# Patient Record
Sex: FEMALE | Race: BLACK OR AFRICAN AMERICAN | Employment: UNEMPLOYED | URBAN - METROPOLITAN AREA
[De-identification: names, ages, dates, MRNs, and addresses within clinical notes are randomized per-mention and may not be internally consistent; named-entity substitution may affect disease eponyms.]

---
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
## 2024-04-29 ENCOUNTER — HOSPITAL ENCOUNTER (EMERGENCY)
Age: 19
Discharge: HOME | End: 2024-04-29

## 2024-04-29 VITALS
HEART RATE: 90 BPM | HEIGHT: 67 IN | TEMPERATURE: 98.7 F | RESPIRATION RATE: 18 BRPM | WEIGHT: 130 LBS | BODY MASS INDEX: 20.4 KG/M2 | SYSTOLIC BLOOD PRESSURE: 111 MMHG | OXYGEN SATURATION: 98 % | DIASTOLIC BLOOD PRESSURE: 54 MMHG

## 2024-04-29 DIAGNOSIS — N89.8 VAGINAL DISCHARGE: Primary | ICD-10-CM

## 2024-04-29 LAB
BILIRUB UR QL: NEGATIVE
GLUCOSE UR QL STRIP.AUTO: NEGATIVE MG/DL
HCG UR QL: NEGATIVE
KETONES UR-MCNC: NEGATIVE MG/DL
LEUKOCYTE ESTERASE UR QL STRIP: NEGATIVE
NITRITE UR QL: NEGATIVE
PH UR: 6 (ref 5–9)
PROT UR QL: ABNORMAL MG/DL
RBC # UR STRIP: NEGATIVE
SERVICE CMNT-IMP: ABNORMAL
SERVICE CMNT-IMP: NORMAL
SP GR UR: 1.02 (ref 1–1.02)
UROBILINOGEN UR QL: 0.2 EU/DL (ref 0.2–1)
WET PREP GENITAL: NORMAL
WET PREP GENITAL: NORMAL

## 2024-04-29 PROCEDURE — 87491 CHLMYD TRACH DNA AMP PROBE: CPT

## 2024-04-29 PROCEDURE — 6360000002 HC RX W HCPCS: Performed by: STUDENT IN AN ORGANIZED HEALTH CARE EDUCATION/TRAINING PROGRAM

## 2024-04-29 PROCEDURE — 99284 EMERGENCY DEPT VISIT MOD MDM: CPT

## 2024-04-29 PROCEDURE — 81003 URINALYSIS AUTO W/O SCOPE: CPT

## 2024-04-29 PROCEDURE — 81025 URINE PREGNANCY TEST: CPT

## 2024-04-29 PROCEDURE — 87591 N.GONORRHOEAE DNA AMP PROB: CPT

## 2024-04-29 PROCEDURE — 87210 SMEAR WET MOUNT SALINE/INK: CPT

## 2024-04-29 PROCEDURE — 96372 THER/PROPH/DIAG INJ SC/IM: CPT

## 2024-04-29 RX ORDER — FLUCONAZOLE 150 MG/1
150 TABLET ORAL ONCE
Qty: 1 TABLET | Refills: 0 | Status: SHIPPED | OUTPATIENT
Start: 2024-04-29 | End: 2024-04-29

## 2024-04-29 RX ORDER — DOXYCYCLINE HYCLATE 100 MG
100 TABLET ORAL 2 TIMES DAILY
Qty: 14 TABLET | Refills: 0 | Status: SHIPPED | OUTPATIENT
Start: 2024-04-29 | End: 2024-05-06

## 2024-04-29 RX ORDER — GENTAMICIN 40 MG/ML
240 INJECTION, SOLUTION INTRAMUSCULAR; INTRAVENOUS ONCE
Status: COMPLETED | OUTPATIENT
Start: 2024-04-29 | End: 2024-04-29

## 2024-04-29 RX ADMIN — GENTAMICIN SULFATE 240 MG: 40 INJECTION, SOLUTION INTRAMUSCULAR; INTRAVENOUS at 22:45

## 2024-04-29 ASSESSMENT — PAIN DESCRIPTION - LOCATION: LOCATION: OTHER (COMMENT)

## 2024-04-29 ASSESSMENT — ENCOUNTER SYMPTOMS
TROUBLE SWALLOWING: 0
COUGH: 0
VOMITING: 0
SHORTNESS OF BREATH: 0
ABDOMINAL PAIN: 1
PHOTOPHOBIA: 0
FACIAL SWELLING: 0

## 2024-04-29 ASSESSMENT — PAIN SCALES - GENERAL: PAINLEVEL_OUTOF10: 6

## 2024-04-29 ASSESSMENT — PAIN DESCRIPTION - ORIENTATION: ORIENTATION: RIGHT

## 2024-04-30 NOTE — ED TRIAGE NOTES
C/o r side pain, describes as cramp, started a week ago ,states not any better since a week, has not taken any meds for it, states intermittent, postion changes and time of day doesn't seem to affect pain, denies injury, nausea, vomiting, or diarrhea. Does report green discharge and odor that started after her last period

## 2024-04-30 NOTE — DISCHARGE INSTRUCTIONS
We have sent gonorrhea and Chlamydia testing to the lab.  You should be able to access your results the next couple days.  Please take the antibiotics for the full course.  Do not engage in any sexual activity till after treatment is complete.  If you are positive, recommend your partner gets tested and treated as well.  Return here for new or worsening symptoms.    As we discussed, I did not find a life threatening cause of your symptoms today. However, THAT DOES NOT MEAN IT COULD NOT DEVELOP. If you develop ANY new or worsening symptoms, it is critical that you return for re-evaluation. This includes any symptoms that are concerning to you, especially symptoms such as constant abdominal pain that does not go away, fevers, vomiting.  If you do not return for re-evaluation, you risk serious complications, including death.

## 2024-04-30 NOTE — ED PROVIDER NOTES
Procedures     Procedures    Orders Placed This Encounter   Procedures    Wet Prep    C.trachomatis N.gonorrhoeae DNA    POCT Urine Dipstick    Misc nursing order (specify)    Misc nursing order (specify)    POC Urine Pregnancy (Select for females age 10-55)    POCT Urinalysis no Micro    POC Pregnancy Urine Qual        Medications given during this emergency department visit:  Medications   gentamicin (GARAMYCIN) injection 240 mg (240 mg IntraMUSCular Given 4/29/24 5747)       Discharge Medication List as of 4/29/2024 11:00 PM        START taking these medications    Details   doxycycline hyclate (VIBRA-TABS) 100 MG tablet Take 1 tablet by mouth 2 times daily for 7 days, Disp-14 tablet, R-0Print      fluconazole (DIFLUCAN) 150 MG tablet Take 1 tablet by mouth once for 1 dose, Disp-1 tablet, R-0Print              Past Medical History:   Diagnosis Date    Asthma     Migraine         History reviewed. No pertinent surgical history.     Social History     Socioeconomic History    Marital status: Single     Spouse name: None    Number of children: None    Years of education: None    Highest education level: None   Tobacco Use    Smoking status: Passive Smoke Exposure - Never Smoker        Discharge Medication List as of 4/29/2024 11:00 PM        CONTINUE these medications which have NOT CHANGED    Details   albuterol sulfate  (90 Base) MCG/ACT inhaler Inhale into the lungsHistorical Med      albuterol (PROVENTIL) (5 MG/ML) 0.5% nebulizer solution Inhale into the lungs onceHistorical Med      ibuprofen (ADVIL;MOTRIN) 600 MG tablet Take 600 mg by mouth every 6 hours as neededHistorical Med      loratadine (CLARITIN) 10 MG tablet Take 10 mg by mouthHistorical Med              Results for orders placed or performed during the hospital encounter of 04/29/24   Wet Prep    Specimen: Vaginal   Result Value Ref Range    Special Requests NO SPECIAL REQUESTS      Wet Prep WBC'S  FEW        Wet Prep NO YEAST,TRICHOMONAS OR

## 2024-05-02 LAB
C TRACH RRNA SPEC QL NAA+PROBE: NEGATIVE
N GONORRHOEA RRNA SPEC QL NAA+PROBE: NEGATIVE
SPECIMEN SOURCE: NORMAL

## 2024-06-05 ENCOUNTER — HOSPITAL ENCOUNTER (EMERGENCY)
Age: 19
Discharge: HOME | End: 2024-06-05
Attending: EMERGENCY MEDICINE

## 2024-06-05 VITALS
TEMPERATURE: 98.4 F | HEART RATE: 68 BPM | WEIGHT: 128 LBS | OXYGEN SATURATION: 98 % | HEIGHT: 67 IN | DIASTOLIC BLOOD PRESSURE: 75 MMHG | BODY MASS INDEX: 20.09 KG/M2 | RESPIRATION RATE: 16 BRPM | SYSTOLIC BLOOD PRESSURE: 122 MMHG

## 2024-06-05 DIAGNOSIS — R10.9 NONSPECIFIC ABDOMINAL PAIN: Primary | ICD-10-CM

## 2024-06-05 DIAGNOSIS — N76.0 VAGINOSIS: ICD-10-CM

## 2024-06-05 LAB
ALBUMIN SERPL-MCNC: 4.1 G/DL (ref 3.5–5)
ALBUMIN/GLOB SERPL: 1.4 (ref 1–1.9)
ALP SERPL-CCNC: 74 U/L (ref 35–104)
ALT SERPL-CCNC: 13 U/L (ref 12–65)
ANION GAP SERPL CALC-SCNC: 10 MMOL/L (ref 9–18)
AST SERPL-CCNC: 19 U/L (ref 15–37)
BACTERIA URNS QL MICRO: ABNORMAL /HPF
BASOPHILS # BLD: 0 K/UL (ref 0–0.2)
BASOPHILS NFR BLD: 0 % (ref 0–2)
BILIRUB SERPL-MCNC: 0.6 MG/DL (ref 0–1.2)
BILIRUB UR QL: NEGATIVE
BUN SERPL-MCNC: 8 MG/DL (ref 6–23)
CALCIUM SERPL-MCNC: 9.1 MG/DL (ref 8.8–10.2)
CASTS URNS QL MICRO: 0 /LPF
CHLORIDE SERPL-SCNC: 106 MMOL/L (ref 98–107)
CO2 SERPL-SCNC: 24 MMOL/L (ref 20–28)
CREAT SERPL-MCNC: 0.97 MG/DL (ref 0.6–1.1)
CRYSTALS URNS QL MICRO: 0 /LPF
DIFFERENTIAL METHOD BLD: NORMAL
EOSINOPHIL # BLD: 0.2 K/UL (ref 0–0.8)
EOSINOPHIL NFR BLD: 2 % (ref 0.5–7.8)
EPI CELLS #/AREA URNS HPF: ABNORMAL /HPF
ERYTHROCYTE [DISTWIDTH] IN BLOOD BY AUTOMATED COUNT: 13.8 % (ref 11.9–14.6)
ERYTHROCYTE [SEDIMENTATION RATE] IN BLOOD: 1 MM/HR (ref 0–20)
GLOBULIN SER CALC-MCNC: 2.9 G/DL (ref 2.3–3.5)
GLUCOSE SERPL-MCNC: 87 MG/DL (ref 70–99)
GLUCOSE UR QL STRIP.AUTO: NEGATIVE MG/DL
HCG UR QL: NEGATIVE
HCT VFR BLD AUTO: 38.8 % (ref 35.8–46.3)
HGB BLD-MCNC: 12.9 G/DL (ref 11.7–15.4)
IMM GRANULOCYTES # BLD AUTO: 0 K/UL (ref 0–0.5)
IMM GRANULOCYTES NFR BLD AUTO: 0 % (ref 0–5)
KETONES UR-MCNC: ABNORMAL MG/DL
LEUKOCYTE ESTERASE UR QL STRIP: ABNORMAL
LIPASE SERPL-CCNC: 25 U/L (ref 13–60)
LYMPHOCYTES # BLD: 2.7 K/UL (ref 0.5–4.6)
LYMPHOCYTES NFR BLD: 28 % (ref 13–44)
MCH RBC QN AUTO: 27.7 PG (ref 26.1–32.9)
MCHC RBC AUTO-ENTMCNC: 33.2 G/DL (ref 31.4–35)
MCV RBC AUTO: 83.4 FL (ref 82–102)
MONOCYTES # BLD: 0.6 K/UL (ref 0.1–1.3)
MONOCYTES NFR BLD: 6 % (ref 4–12)
MUCOUS THREADS URNS QL MICRO: 0 /LPF
NEUTS SEG # BLD: 5.9 K/UL (ref 1.7–8.2)
NEUTS SEG NFR BLD: 63 % (ref 43–78)
NITRITE UR QL: NEGATIVE
NRBC # BLD: 0 K/UL (ref 0–0.2)
OTHER OBSERVATIONS: ABNORMAL
PH UR: 5.5 (ref 5–9)
PLATELET # BLD AUTO: 299 K/UL (ref 150–450)
PMV BLD AUTO: 10.7 FL (ref 9.4–12.3)
POTASSIUM SERPL-SCNC: 4.3 MMOL/L (ref 3.5–5.1)
PROT SERPL-MCNC: 7 G/DL (ref 6.3–8.2)
PROT UR QL: 30 MG/DL
RBC # BLD AUTO: 4.65 M/UL (ref 4.05–5.2)
RBC # UR STRIP: NEGATIVE
RBC #/AREA URNS HPF: ABNORMAL /HPF
SERVICE CMNT-IMP: ABNORMAL
SODIUM SERPL-SCNC: 140 MMOL/L (ref 136–145)
SP GR UR: 1.02 (ref 1–1.02)
UROBILINOGEN UR QL: 0.2 EU/DL (ref 0.2–1)
WBC # BLD AUTO: 9.4 K/UL (ref 4.3–11.1)
WBC URNS QL MICRO: ABNORMAL /HPF

## 2024-06-05 PROCEDURE — 80053 COMPREHEN METABOLIC PANEL: CPT

## 2024-06-05 PROCEDURE — 85652 RBC SED RATE AUTOMATED: CPT

## 2024-06-05 PROCEDURE — 99283 EMERGENCY DEPT VISIT LOW MDM: CPT

## 2024-06-05 PROCEDURE — 81025 URINE PREGNANCY TEST: CPT

## 2024-06-05 PROCEDURE — 83690 ASSAY OF LIPASE: CPT

## 2024-06-05 PROCEDURE — 81001 URINALYSIS AUTO W/SCOPE: CPT

## 2024-06-05 PROCEDURE — 85025 COMPLETE CBC W/AUTO DIFF WBC: CPT

## 2024-06-05 RX ORDER — METRONIDAZOLE 500 MG/1
500 TABLET ORAL 2 TIMES DAILY
Qty: 10 TABLET | Refills: 0 | Status: SHIPPED | OUTPATIENT
Start: 2024-06-05 | End: 2024-06-10

## 2024-06-05 ASSESSMENT — PAIN SCALES - GENERAL: PAINLEVEL_OUTOF10: 7

## 2024-06-05 ASSESSMENT — LIFESTYLE VARIABLES
HOW OFTEN DO YOU HAVE A DRINK CONTAINING ALCOHOL: NEVER
HOW MANY STANDARD DRINKS CONTAINING ALCOHOL DO YOU HAVE ON A TYPICAL DAY: PATIENT DOES NOT DRINK

## 2024-06-05 ASSESSMENT — PAIN - FUNCTIONAL ASSESSMENT: PAIN_FUNCTIONAL_ASSESSMENT: 0-10

## 2024-06-05 ASSESSMENT — PAIN DESCRIPTION - LOCATION: LOCATION: ABDOMEN

## 2024-06-05 ASSESSMENT — ENCOUNTER SYMPTOMS: ABDOMINAL PAIN: 1

## 2024-06-05 ASSESSMENT — PAIN DESCRIPTION - ORIENTATION: ORIENTATION: LEFT

## 2024-06-05 NOTE — ED TRIAGE NOTES
Pt present with complaint of pain to her left sided upper quad abdominal pain.  Pt states she has been seen previously for the same complaint, but that pain remains unchanged. Denies n/v/d

## 2024-06-05 NOTE — ED PROVIDER NOTES
status: Single   Tobacco Use    Smoking status: Passive Smoke Exposure - Never Smoker   Substance and Sexual Activity    Alcohol use: Not Currently        Previous Medications    ALBUTEROL (PROVENTIL) (5 MG/ML) 0.5% NEBULIZER SOLUTION    Inhale into the lungs once    ALBUTEROL SULFATE  (90 BASE) MCG/ACT INHALER    Inhale into the lungs    IBUPROFEN (ADVIL;MOTRIN) 600 MG TABLET    Take 600 mg by mouth every 6 hours as needed    LORATADINE (CLARITIN) 10 MG TABLET    Take 10 mg by mouth        Results for orders placed or performed during the hospital encounter of 06/05/24   CBC with Auto Differential   Result Value Ref Range    WBC 9.4 4.3 - 11.1 K/uL    RBC 4.65 4.05 - 5.2 M/uL    Hemoglobin 12.9 11.7 - 15.4 g/dL    Hematocrit 38.8 35.8 - 46.3 %    MCV 83.4 82.0 - 102.0 FL    MCH 27.7 26.1 - 32.9 PG    MCHC 33.2 31.4 - 35.0 g/dL    RDW 13.8 11.9 - 14.6 %    Platelets 299 150 - 450 K/uL    MPV 10.7 9.4 - 12.3 FL    nRBC 0.00 0.0 - 0.2 K/uL    Differential Type AUTOMATED      Neutrophils % 63 43 - 78 %    Lymphocytes % 28 13 - 44 %    Monocytes % 6 4.0 - 12.0 %    Eosinophils % 2 0.5 - 7.8 %    Basophils % 0 0.0 - 2.0 %    Immature Granulocytes % 0 0.0 - 5.0 %    Neutrophils Absolute 5.9 1.7 - 8.2 K/UL    Lymphocytes Absolute 2.7 0.5 - 4.6 K/UL    Monocytes Absolute 0.6 0.1 - 1.3 K/UL    Eosinophils Absolute 0.2 0.0 - 0.8 K/UL    Basophils Absolute 0.0 0.0 - 0.2 K/UL    Immature Granulocytes Absolute 0.0 0.0 - 0.5 K/UL   Comprehensive Metabolic Panel   Result Value Ref Range    Sodium 140 136 - 145 mmol/L    Potassium 4.3 3.5 - 5.1 mmol/L    Chloride 106 98 - 107 mmol/L    CO2 24 20 - 28 mmol/L    Anion Gap 10 9 - 18 mmol/L    Glucose 87 70 - 99 mg/dL    BUN 8 6 - 23 MG/DL    Creatinine 0.97 0.60 - 1.10 MG/DL    Est, Glom Filt Rate 86 >60 ml/min/1.73m2    Calcium 9.1 8.8 - 10.2 MG/DL    Total Bilirubin 0.6 0.0 - 1.2 MG/DL    ALT 13 12 - 65 U/L    AST 19 15 - 37 U/L    Alk Phosphatase 74 35 - 104 U/L    Total

## 2024-09-05 ENCOUNTER — HOSPITAL ENCOUNTER (EMERGENCY)
Age: 19
Discharge: HOME | End: 2024-09-05
Payer: COMMERCIAL

## 2024-09-05 VITALS
RESPIRATION RATE: 16 BRPM | OXYGEN SATURATION: 97 % | HEIGHT: 67 IN | TEMPERATURE: 98.7 F | WEIGHT: 128 LBS | SYSTOLIC BLOOD PRESSURE: 109 MMHG | HEART RATE: 71 BPM | BODY MASS INDEX: 20.09 KG/M2 | DIASTOLIC BLOOD PRESSURE: 72 MMHG

## 2024-09-05 DIAGNOSIS — B34.9 VIRAL SYNDROME: Primary | ICD-10-CM

## 2024-09-05 DIAGNOSIS — N89.8 VAGINAL LESION: ICD-10-CM

## 2024-09-05 DIAGNOSIS — N89.8 VAGINAL DISCHARGE: ICD-10-CM

## 2024-09-05 LAB
BACTERIA URNS QL MICRO: 0 /HPF
BILIRUB UR QL: NEGATIVE
EPI CELLS #/AREA URNS HPF: NORMAL /HPF
FLUAV RNA SPEC QL NAA+PROBE: NOT DETECTED
FLUBV RNA SPEC QL NAA+PROBE: NOT DETECTED
GLUCOSE UR QL STRIP.AUTO: NEGATIVE MG/DL
HCG UR QL: NEGATIVE
HIV 1+2 AB+HIV1 P24 AG SERPL QL IA: NONREACTIVE
HIV 1/2 RESULT COMMENT: NORMAL
KETONES UR-MCNC: NEGATIVE MG/DL
LEUKOCYTE ESTERASE UR QL STRIP: ABNORMAL
MUCOUS THREADS URNS QL MICRO: 0 /LPF
NITRITE UR QL: NEGATIVE
PH UR: 6 (ref 5–9)
PROT UR QL: NEGATIVE MG/DL
RBC # UR STRIP: NEGATIVE
RBC #/AREA URNS HPF: NORMAL /HPF
SARS-COV-2 RDRP RESP QL NAA+PROBE: NOT DETECTED
SERVICE CMNT-IMP: ABNORMAL
SERVICE CMNT-IMP: NORMAL
SOURCE: NORMAL
SP GR UR: 1.02 (ref 1–1.02)
STREP, MOLECULAR: NOT DETECTED
T PALLIDUM AB SER QL IA: NONREACTIVE
UROBILINOGEN UR QL: 0.2 EU/DL (ref 0.2–1)
WBC URNS QL MICRO: NORMAL /HPF
WET PREP GENITAL: NORMAL
WET PREP GENITAL: NORMAL

## 2024-09-05 PROCEDURE — 87635 SARS-COV-2 COVID-19 AMP PRB: CPT

## 2024-09-05 PROCEDURE — 87491 CHLMYD TRACH DNA AMP PROBE: CPT

## 2024-09-05 PROCEDURE — 86696 HERPES SIMPLEX TYPE 2 TEST: CPT

## 2024-09-05 PROCEDURE — 81025 URINE PREGNANCY TEST: CPT

## 2024-09-05 PROCEDURE — 6360000002 HC RX W HCPCS

## 2024-09-05 PROCEDURE — 87389 HIV-1 AG W/HIV-1&-2 AB AG IA: CPT

## 2024-09-05 PROCEDURE — 2580000003 HC RX 258

## 2024-09-05 PROCEDURE — 6370000000 HC RX 637 (ALT 250 FOR IP)

## 2024-09-05 PROCEDURE — 87651 STREP A DNA AMP PROBE: CPT

## 2024-09-05 PROCEDURE — 96372 THER/PROPH/DIAG INJ SC/IM: CPT

## 2024-09-05 PROCEDURE — 86695 HERPES SIMPLEX TYPE 1 TEST: CPT

## 2024-09-05 PROCEDURE — 87591 N.GONORRHOEAE DNA AMP PROB: CPT

## 2024-09-05 PROCEDURE — 99284 EMERGENCY DEPT VISIT MOD MDM: CPT

## 2024-09-05 PROCEDURE — 81001 URINALYSIS AUTO W/SCOPE: CPT

## 2024-09-05 PROCEDURE — 86780 TREPONEMA PALLIDUM: CPT

## 2024-09-05 PROCEDURE — 87210 SMEAR WET MOUNT SALINE/INK: CPT

## 2024-09-05 PROCEDURE — 87502 INFLUENZA DNA AMP PROBE: CPT

## 2024-09-05 RX ORDER — DOXYCYCLINE HYCLATE 100 MG
100 TABLET ORAL 2 TIMES DAILY
Qty: 14 TABLET | Refills: 0 | Status: SHIPPED | OUTPATIENT
Start: 2024-09-05 | End: 2024-09-12

## 2024-09-05 RX ORDER — DIPHENHYDRAMINE HCL 25 MG
50 CAPSULE ORAL
Status: COMPLETED | OUTPATIENT
Start: 2024-09-05 | End: 2024-09-05

## 2024-09-05 RX ORDER — VALACYCLOVIR HYDROCHLORIDE 1 G/1
1000 TABLET, FILM COATED ORAL 3 TIMES DAILY
Qty: 21 TABLET | Refills: 0 | Status: SHIPPED | OUTPATIENT
Start: 2024-09-05 | End: 2024-09-12

## 2024-09-05 RX ADMIN — DIPHENHYDRAMINE HYDROCHLORIDE 50 MG: 25 CAPSULE ORAL at 19:19

## 2024-09-05 RX ADMIN — WATER 500 MG: 1 INJECTION INTRAMUSCULAR; INTRAVENOUS; SUBCUTANEOUS at 19:19

## 2024-09-05 ASSESSMENT — PAIN - FUNCTIONAL ASSESSMENT: PAIN_FUNCTIONAL_ASSESSMENT: 0-10

## 2024-09-05 ASSESSMENT — LIFESTYLE VARIABLES
HOW MANY STANDARD DRINKS CONTAINING ALCOHOL DO YOU HAVE ON A TYPICAL DAY: PATIENT DOES NOT DRINK
HOW OFTEN DO YOU HAVE A DRINK CONTAINING ALCOHOL: NEVER

## 2024-09-05 ASSESSMENT — PAIN DESCRIPTION - LOCATION: LOCATION: HEAD

## 2024-09-05 ASSESSMENT — PAIN SCALES - GENERAL: PAINLEVEL_OUTOF10: 5

## 2024-09-05 NOTE — ED TRIAGE NOTES
Pt presents ambulatory to triage with co generalized body aches and fever at home yesterday.  Pt states she took antibiotics at home and now has vaginal itching.    Requesting testing for flu and covid.

## 2024-09-05 NOTE — ED PROVIDER NOTES
Emergency Department Provider Note       PCP: Jackie Pool MD   Age: 19 y.o.   Sex: female     DISPOSITION Decision To Discharge 09/05/2024 08:07:32 PM  Condition at Disposition: Good       ICD-10-CM    1. Viral syndrome  B34.9       2. Vaginal discharge  N89.8 [facility]      3. Vaginal lesion  N89.8 [facility]          Medical Decision Making     Patient is a 19-year-old female presenting with fatigue, body aches, sore throats, congestion, and sneezing with concern for COVID after positive exposure last week.  She is also complaining of some vaginal lesions and discomfort over the past 3 days after having unprotected intercourse and is concerned for STDs.    On presentation, patient is afebrile, vital signs are stable, and she is well-appearing in no acute distress.  HEENT exam is benign and reassuring.  She has some mild posterior pharyngeal erythema with some postnasal drip present.  No nuchal rigidity or meningeal signs.  Lungs clear to auscultation in all fields without crackles, wheezes, or other adventitious sounds.  Will obtain flu, strep, and COVID swabs.    Vaginal exam revealed a thick milky greenish discharge in the vaginal canal.  There were some sores/ulcerations on an erythematous base present to the labia majora region concerning for HSV 2.  No cervical motion tenderness or any other abnormalities.    Obtained GC and wet prep swabs.  Patient would like to be prophylactically treated for both gonorrhea and chlamydia.  She would also like to be tested for HSV, HIV, and syphilis.    Patient does have a reported allergy to cefdinir with hives and shortness of breath.  Patient states previously she did have a rash when she took the cefdinir.  I spoke with the hospital pharmacist about alternative regimens for gonorrhea.  We can give patient 500 of ciprofloxacin, however gonorrhea has a large amount of resistance to this antibiotic and  detected NOTD     Group A Strep Screen By PCR    Specimen: Throat   Result Value Ref Range    Strep, Molecular Not detected     Wet prep, genital    Specimen: Vaginal   Result Value Ref Range    Special Requests NO SPECIAL REQUESTS      Wet Prep WBC'S  3 TO 5 PER HPF        Wet Prep NO YEAST,TRICHOMONAS OR CLUE CELLS NOTED     HIV 1/2 Ag/Ab, 4TH Generation,W Rflx Confirm   Result Value Ref Range    HIV 1/2 Interp NONREACTIVE NR      HIV 1/2 Result Comment        While this assay is highly sensitive, a non-reactive result for HIV antibodies HIV-1 and HIV-2 and p24 antigen does not preclude the possiblity of exposure to or infection with HIV.   Treponema Pallidum (Syphilis) Antibody   Result Value Ref Range    Syphillis T. Palladium Ab w/Reflex to RPR NONREACTIVE NR     Urinalysis, Micro   Result Value Ref Range    WBC, UA 3-5 0 /hpf    RBC, UA 3-5 0 /hpf    Epithelial Cells, UA 5-10 0 /hpf    BACTERIA, URINE 0 0 /hpf    Mucus, UA 0 0 /lpf   POCT Urinalysis no Micro   Result Value Ref Range    Specific Gravity, Urine, POC 1.020 1.001 - 1.023      pH, Urine, POC 6.0 5.0 - 9.0      Protein, Urine, POC Negative NEG mg/dL    Glucose, UA POC Negative NEG mg/dL    Ketones, Urine, POC Negative NEG mg/dL    Bilirubin, Urine, POC Negative NEG      Blood, UA POC Negative NEG      URINE UROBILINOGEN POC 0.2 0.2 - 1.0 EU/dL    Nitrite, Urine, POC Negative NEG      Leukocyte Est, UA POC SMALL (A) NEG      Performed by: Rona Pena    POC Pregnancy Urine Qual   Result Value Ref Range    Preg Test, Ur Negative NEG           No orders to display                Recent Labs     09/05/24  1815   COVID19 Not detected       Voice dictation software was used during the making of this note.  This software is not perfect and grammatical and other typographical errors may be present.  This note has not been completely proofread for errors.     Maureen Poe PA-C  09/05/24 9215

## 2024-09-06 NOTE — DISCHARGE INSTRUCTIONS
Please begin taking the doxycycline as prescribed as well as the Valtrex, you can take these medications at the same time.  The doxycycline will cover for chlamydia and the Valtrex will cover for possible herpes.  Continue to closely monitor your results on MyChart.  Please notify all sexual partners of positive results.  From from any sexual activity for the next week and finish full course of antibiotics.  I have also placed a referral into the gynecologist, their numbers listed above.  Please return to the ED immediately with any new or worsening symptoms.

## 2024-09-06 NOTE — ED NOTES
Patient mobility status  with no difficulty. Provider aware     I have reviewed discharge instructions with the patient.  The patient verbalized understanding.    Patient left ED via Discharge Method: ambulatory to Home with parent.    Opportunity for questions and clarification provided.     Patient given 2 scripts.

## 2024-09-08 LAB
HSV1 IGG SER IA-ACNC: 12 INDEX (ref 0–0.9)
HSV2 IGG SER IA-ACNC: <0.91 INDEX (ref 0–0.9)

## 2024-10-02 ENCOUNTER — OFFICE VISIT (OUTPATIENT)
Dept: OBGYN CLINIC | Age: 19
End: 2024-10-02
Payer: COMMERCIAL

## 2024-10-02 VITALS
DIASTOLIC BLOOD PRESSURE: 58 MMHG | HEIGHT: 67 IN | SYSTOLIC BLOOD PRESSURE: 102 MMHG | WEIGHT: 128 LBS | BODY MASS INDEX: 20.09 KG/M2

## 2024-10-02 DIAGNOSIS — N89.8 VAGINAL DISCHARGE: Primary | ICD-10-CM

## 2024-10-02 DIAGNOSIS — N89.8 VAGINAL DISCHARGE: ICD-10-CM

## 2024-10-02 DIAGNOSIS — N89.8 VAGINAL ODOR: ICD-10-CM

## 2024-10-02 DIAGNOSIS — B00.9 HSV (HERPES SIMPLEX VIRUS) INFECTION: ICD-10-CM

## 2024-10-02 DIAGNOSIS — Z72.51 UNPROTECTED SEX: ICD-10-CM

## 2024-10-02 PROCEDURE — 99203 OFFICE O/P NEW LOW 30 MIN: CPT | Performed by: NURSE PRACTITIONER

## 2024-10-02 NOTE — PROGRESS NOTES
Mal Rowan is a 19 y.o. No obstetric history on file. who is here today to discuss vaginal irritation/odor she noticed after using vaginal soap    No symptoms today  Menses Q month - no concerns  Denies hx stds  Sexually active. Not using condoms. Declines birth control. Not actively TTC but ok if it occurs    Due for pap smear in January. Will schedule appt  Patient's last menstrual period was 09/23/2024 (exact date).        Past Medical History:   Diagnosis Date    Asthma     Eczema     Migraine        No past surgical history on file.    Family History   Problem Relation Age of Onset    Breast Cancer Maternal Grandmother     Breast Cancer Paternal Great Grandmother     Uterine Cancer Maternal Great Grandmother     Colon Cancer Neg Hx     Ovarian Cancer Neg Hx        Social History     Socioeconomic History    Marital status: Single     Spouse name: Not on file    Number of children: Not on file    Years of education: Not on file    Highest education level: Not on file   Occupational History    Not on file   Tobacco Use    Smoking status: Never     Passive exposure: Yes    Smokeless tobacco: Not on file   Substance and Sexual Activity    Alcohol use: Not Currently    Drug use: Not on file    Sexual activity: Yes     Partners: Male     Birth control/protection: Condom   Other Topics Concern    Not on file   Social History Narrative    Not on file     Social Determinants of Health     Financial Resource Strain: Not on file   Food Insecurity: Not on file   Transportation Needs: Not on file   Physical Activity: Not on file   Stress: Not on file   Social Connections: Not on file   Intimate Partner Violence: Not on file   Housing Stability: Not on file           Objective:    Vitals:    10/02/24 1439   BP: (!) 102/58   Site: Right Upper Arm   Position: Sitting   Weight: 58.1 kg (128 lb)   Height: 1.702 m (5' 7\")         Constitutional:  well-developed, well-nourished, and in no distress.     Mental: Alert

## 2024-10-02 NOTE — PROGRESS NOTES
New patient here for concern of vaginal odor that is more noticeable after menses end.   Denies vaginal s/s today. Would still live pelvic exam today just to make sure everything is okay and discuss recommendations for her odor.     LAST PAP:  never     LAST MAMMO:  never    LMP:  Patient's last menstrual period was 09/23/2024 (exact date).    BIRTH CONTROL: condoms     TOBACCO USE:  No    FAMILY HISTORY OF:   Breast Cancer:  Yes-maternal grandmother, paternal great grandmother   Ovarian Cancer:  Yes-maternal great grandmother    Uterine Cancer:  No   Colon Cancer:  No    Vitals:    10/02/24 1439   BP: (!) 102/58   Site: Right Upper Arm   Position: Sitting   Weight: 58.1 kg (128 lb)   Height: 1.702 m (5' 7\")        JANEEN LAROSE RN  10/02/24  2:50 PM

## 2024-10-02 NOTE — ASSESSMENT & PLAN NOTE
seroPOS for HSV -1  Has never had an outbreak  [facility] HSV info reviewed at length with pt including etiology, prevention, symptoms, treatment prn    https://www.cdc.gov/herpes/about/index.html

## 2024-10-02 NOTE — ASSESSMENT & PLAN NOTE
Discharge noted today on exma, no symptoms today  Nuswab collected  Below reviewed    You can take the following steps to help prevent future  vaginal infections: Wipe from front to back.  Wear cotton underwear.  No tight clothing.  Avoid bubble baths.  No scented soaps, detergents or other hygiene products.  No douching. Use condoms during intercourse. After intercourse, go to the bathroom to urinate and clean the vaginal area.      Do not use alcohol while taking medication and for 3 days after completing medication.

## 2024-10-03 NOTE — PROGRESS NOTES
Chaperone for Intimate Exam     Chaperone was offer accepted as part of the rooming process    Chaperone: Carolina Barrera

## 2024-10-03 NOTE — PROGRESS NOTES
I have reviewed the patient's visit today including history, exam and assessment by AVELINO Aranda.  I agree with treatment/plan as above.    Karthik Brand MD  8:10 AM  10/03/24

## 2024-10-06 ENCOUNTER — TELEPHONE (OUTPATIENT)
Dept: OBGYN CLINIC | Age: 19
End: 2024-10-06

## 2024-10-06 LAB
A VAGINAE DNA VAG QL NAA+PROBE: NORMAL SCORE
BVAB2 DNA VAG QL NAA+PROBE: NORMAL SCORE
C ALBICANS DNA VAG QL NAA+PROBE: NEGATIVE
C GLABRATA DNA VAG QL NAA+PROBE: NEGATIVE
C TRACH RRNA SPEC QL NAA+PROBE: NEGATIVE
MEGA1 DNA VAG QL NAA+PROBE: NORMAL SCORE
N GONORRHOEA RRNA SPEC QL NAA+PROBE: NEGATIVE
SPECIMEN SOURCE: NORMAL
T VAGINALIS RRNA SPEC QL NAA+PROBE: NEGATIVE

## 2024-10-07 NOTE — TELEPHONE ENCOUNTER
Pt lvm returning my call. Called pt back, informed pt of add on testing. Pt was confused so explained the bacteria and her results. Pt stated she is not having symptoms of anything. Informed pt then we will not do the add on testing since she does not have vaginal symtpoms. Pt voiced understanding.

## 2024-12-02 ENCOUNTER — TELEPHONE (OUTPATIENT)
Dept: OBGYN CLINIC | Age: 19
End: 2024-12-02

## 2024-12-02 NOTE — TELEPHONE ENCOUNTER
Patient called and asked what she can take for n/c. Reviewed pregnancy safe medications for n/v, unisom, B6, and meenakshi. Patient states she has tried B6 and it has not be helpful. I recommended she try the other two and if those are not helpful then she needs to reach back out.    Also reviewed with patient that if she is unable to keep fluids down for more than 6-8 hours then she needs to head to an ER or urgent care for fluids and let us know if this occurs.     Patient verbalized understanding. Also stated that I will send the recommendations and virtual prenatal booklet to patient via [employer]. Patient verbalized understanding.     ii4b message sent.

## 2024-12-04 ENCOUNTER — HOSPITAL ENCOUNTER (EMERGENCY)
Age: 19
Discharge: HOME | End: 2024-12-04
Attending: EMERGENCY MEDICINE
Payer: COMMERCIAL

## 2024-12-04 VITALS
DIASTOLIC BLOOD PRESSURE: 67 MMHG | HEIGHT: 66 IN | TEMPERATURE: 98 F | OXYGEN SATURATION: 100 % | WEIGHT: 128 LBS | SYSTOLIC BLOOD PRESSURE: 102 MMHG | HEART RATE: 69 BPM | RESPIRATION RATE: 20 BRPM | BODY MASS INDEX: 20.57 KG/M2

## 2024-12-04 DIAGNOSIS — O21.0 MILD HYPEREMESIS GRAVIDARUM, ANTEPARTUM: Primary | ICD-10-CM

## 2024-12-04 LAB
ALBUMIN SERPL-MCNC: 4.6 G/DL (ref 3.5–5)
ALBUMIN/GLOB SERPL: 1.5 (ref 1–1.9)
ALP SERPL-CCNC: 58 U/L (ref 35–104)
ALT SERPL-CCNC: 5 U/L (ref 12–65)
ANION GAP SERPL CALC-SCNC: 12 MMOL/L (ref 7–16)
APPEARANCE UR: ABNORMAL
AST SERPL-CCNC: 17 U/L (ref 15–37)
BACTERIA URNS QL MICRO: ABNORMAL /HPF
BASOPHILS # BLD: 0 K/UL (ref 0–0.2)
BASOPHILS NFR BLD: 0 % (ref 0–2)
BILIRUB SERPL-MCNC: 1.3 MG/DL (ref 0–1.2)
BILIRUB UR QL: NEGATIVE
BUN SERPL-MCNC: 9 MG/DL (ref 6–23)
CALCIUM SERPL-MCNC: 9.4 MG/DL (ref 8.8–10.2)
CHLORIDE SERPL-SCNC: 98 MMOL/L (ref 98–107)
CO2 SERPL-SCNC: 23 MMOL/L (ref 20–29)
COLOR UR: YELLOW
CREAT SERPL-MCNC: 0.71 MG/DL (ref 0.8–1.3)
DIFFERENTIAL METHOD BLD: ABNORMAL
EOSINOPHIL # BLD: 0 K/UL (ref 0–0.8)
EOSINOPHIL NFR BLD: 0 % (ref 0.5–7.8)
EPI CELLS #/AREA URNS HPF: ABNORMAL /HPF
ERYTHROCYTE [DISTWIDTH] IN BLOOD BY AUTOMATED COUNT: 13.7 % (ref 11.9–14.6)
GLOBULIN SER CALC-MCNC: 3 G/DL (ref 2.3–3.5)
GLUCOSE SERPL-MCNC: 93 MG/DL (ref 65–100)
GLUCOSE UR STRIP.AUTO-MCNC: NEGATIVE MG/DL
HCG UR QL: POSITIVE
HCT VFR BLD AUTO: 36.8 % (ref 35.8–46.3)
HGB BLD-MCNC: 12.9 G/DL (ref 11.7–15.4)
HGB UR QL STRIP: ABNORMAL
IMM GRANULOCYTES # BLD AUTO: 0 K/UL (ref 0–0.5)
IMM GRANULOCYTES NFR BLD AUTO: 0 % (ref 0–5)
KETONES UR QL STRIP.AUTO: 80 MG/DL
LEUKOCYTE ESTERASE UR QL STRIP.AUTO: NEGATIVE
LIPASE SERPL-CCNC: 19 U/L (ref 13–60)
LYMPHOCYTES # BLD: 1.3 K/UL (ref 0.5–4.6)
LYMPHOCYTES NFR BLD: 12 % (ref 13–44)
MCH RBC QN AUTO: 28.2 PG (ref 26.1–32.9)
MCHC RBC AUTO-ENTMCNC: 35.1 G/DL (ref 31.4–35)
MCV RBC AUTO: 80.5 FL (ref 82–102)
MONOCYTES # BLD: 0.7 K/UL (ref 0.1–1.3)
MONOCYTES NFR BLD: 6 % (ref 4–12)
MUCOUS THREADS URNS QL MICRO: ABNORMAL /LPF
NEUTS SEG # BLD: 9 K/UL (ref 1.7–8.2)
NEUTS SEG NFR BLD: 81 % (ref 43–78)
NITRITE UR QL STRIP.AUTO: NEGATIVE
NRBC # BLD: 0 K/UL (ref 0–0.2)
OTHER OBSERVATIONS: ABNORMAL
PH UR STRIP: 6 (ref 5–9)
PLATELET # BLD AUTO: 300 K/UL (ref 150–450)
PMV BLD AUTO: 10.6 FL (ref 9.4–12.3)
POTASSIUM SERPL-SCNC: 3.8 MMOL/L (ref 3.5–5.1)
PROT SERPL-MCNC: 7.6 G/DL (ref 6.3–8.2)
PROT UR STRIP-MCNC: NEGATIVE MG/DL
RBC # BLD AUTO: 4.57 M/UL (ref 4.05–5.2)
RBC #/AREA URNS HPF: ABNORMAL /HPF
SODIUM SERPL-SCNC: 133 MMOL/L (ref 133–143)
SP GR UR REFRACTOMETRY: 1.02 (ref 1–1.02)
UROBILINOGEN UR QL STRIP.AUTO: 0.2 EU/DL (ref 0.2–1)
WBC # BLD AUTO: 11.1 K/UL (ref 4.3–11.1)
WBC URNS QL MICRO: ABNORMAL /HPF

## 2024-12-04 PROCEDURE — 87086 URINE CULTURE/COLONY COUNT: CPT

## 2024-12-04 PROCEDURE — 6360000002 HC RX W HCPCS: Performed by: EMERGENCY MEDICINE

## 2024-12-04 PROCEDURE — 81001 URINALYSIS AUTO W/SCOPE: CPT

## 2024-12-04 PROCEDURE — 85025 COMPLETE CBC W/AUTO DIFF WBC: CPT

## 2024-12-04 PROCEDURE — 81025 URINE PREGNANCY TEST: CPT

## 2024-12-04 PROCEDURE — 99284 EMERGENCY DEPT VISIT MOD MDM: CPT

## 2024-12-04 PROCEDURE — 2580000003 HC RX 258: Performed by: EMERGENCY MEDICINE

## 2024-12-04 PROCEDURE — 80053 COMPREHEN METABOLIC PANEL: CPT

## 2024-12-04 PROCEDURE — 83690 ASSAY OF LIPASE: CPT

## 2024-12-04 PROCEDURE — 96374 THER/PROPH/DIAG INJ IV PUSH: CPT

## 2024-12-04 RX ORDER — ONDANSETRON 4 MG/1
4 TABLET, ORALLY DISINTEGRATING ORAL 3 TIMES DAILY PRN
Qty: 21 TABLET | Refills: 0 | Status: SHIPPED | OUTPATIENT
Start: 2024-12-04

## 2024-12-04 RX ORDER — ONDANSETRON 2 MG/ML
4 INJECTION INTRAMUSCULAR; INTRAVENOUS ONCE
Status: COMPLETED | OUTPATIENT
Start: 2024-12-04 | End: 2024-12-04

## 2024-12-04 RX ORDER — DOXYLAMINE SUCCINATE AND PYRIDOXINE HYDROCHLORIDE, DELAYED RELEASE TABLETS 10 MG/10 MG 10; 10 MG/1; MG/1
TABLET, DELAYED RELEASE ORAL
Qty: 60 TABLET | Refills: 0 | Status: SHIPPED | OUTPATIENT
Start: 2024-12-04

## 2024-12-04 RX ADMIN — DEXTROSE AND SODIUM CHLORIDE 1000 ML: 5; 900 INJECTION, SOLUTION INTRAVENOUS at 11:56

## 2024-12-04 RX ADMIN — ONDANSETRON 4 MG: 2 INJECTION, SOLUTION INTRAMUSCULAR; INTRAVENOUS at 11:52

## 2024-12-04 ASSESSMENT — PAIN - FUNCTIONAL ASSESSMENT: PAIN_FUNCTIONAL_ASSESSMENT: NONE - DENIES PAIN

## 2024-12-04 NOTE — ED TRIAGE NOTES
Pt to ed with c/o vomting since Thanksgiving. Pt is currently 6 weeks 4 days pregnant. Pt has OB appt in January for 1st appt. Per mom pt has been taking vit B6 and Unisom for sx without improvement. She is unable to keep anything liquids down.

## 2024-12-04 NOTE — ED PROVIDER NOTES
Emergency Department Provider Note       PCP: Garry Martines MD   Age: 19 y.o.   Sex: female     DISPOSITION Decision To Discharge 12/04/2024 01:29:15 PM    ICD-10-CM    1. Mild hyperemesis gravidarum, antepartum  O21.0           Medical Decision Making     Patient improved after fluids and nausea medicine.  Home with Diclegis she has an Zofran.     1 or more acute illnesses that pose a threat to life or bodily function.   Prescription drug management performed.  Shared medical decision making was utilized in creating the patients health plan today.  I independently ordered and reviewed each unique test.       The patients assessment required an independent historian: mom.  The reason they were needed is important historical information not provided by the patient.                  History     Presents with complaint of persistent vomiting.  Patient has not been eating since Friday.  Has nausea with Pedialyte.  Based on patient report states she is 6 weeks and 4 days pregnant.  She has an appointment with Dr. Brand on January 2.  No abdominal pain and no vaginal bleeding.  G1, P0.  Mom gives lots of the history.  Has taken Unisom and B6 but not together and not consistently because it made her feel jittery.      The history is provided by the patient and a parent.     Physical Exam     Vitals signs and nursing note reviewed:  Vitals:    12/04/24 1127   BP: 117/75   Pulse: 69   Resp: 20   Temp: 98 °F (36.7 °C)   SpO2: 100%   Weight: 58.1 kg (128 lb)   Height: 1.676 m (5' 6\")      Physical Exam  Vitals and nursing note reviewed.   Constitutional:       General: She is not in acute distress.     Appearance: Normal appearance. She is not ill-appearing.   HENT:      Head: Normocephalic and atraumatic.   Eyes:      Conjunctiva/sclera: Conjunctivae normal.   Cardiovascular:      Rate and Rhythm: Normal rate and regular rhythm.   Pulmonary:      Effort: Pulmonary effort is normal. No respiratory distress.

## 2024-12-06 LAB
BACTERIA SPEC CULT: NORMAL
SERVICE CMNT-IMP: NORMAL

## 2024-12-10 ENCOUNTER — HOSPITAL ENCOUNTER (EMERGENCY)
Age: 19
Discharge: HOME | End: 2024-12-10
Attending: STUDENT IN AN ORGANIZED HEALTH CARE EDUCATION/TRAINING PROGRAM
Payer: COMMERCIAL

## 2024-12-10 ENCOUNTER — APPOINTMENT (OUTPATIENT)
Dept: ULTRASOUND IMAGING | Age: 19
End: 2024-12-10
Payer: COMMERCIAL

## 2024-12-10 VITALS
SYSTOLIC BLOOD PRESSURE: 108 MMHG | TEMPERATURE: 97.8 F | OXYGEN SATURATION: 99 % | DIASTOLIC BLOOD PRESSURE: 80 MMHG | BODY MASS INDEX: 19.29 KG/M2 | WEIGHT: 120 LBS | HEART RATE: 88 BPM | RESPIRATION RATE: 18 BRPM | HEIGHT: 66 IN

## 2024-12-10 DIAGNOSIS — O21.9 NAUSEA/VOMITING IN PREGNANCY: Primary | ICD-10-CM

## 2024-12-10 LAB
ALBUMIN SERPL-MCNC: 4 G/DL (ref 3.5–5)
ALBUMIN/GLOB SERPL: 1.1 (ref 1–1.9)
ALP SERPL-CCNC: 53 U/L (ref 35–104)
ALT SERPL-CCNC: 6 U/L (ref 8–45)
ANION GAP SERPL CALC-SCNC: 14 MMOL/L (ref 7–16)
APPEARANCE UR: ABNORMAL
AST SERPL-CCNC: 16 U/L (ref 15–37)
BACTERIA URNS QL MICRO: ABNORMAL /HPF
BASOPHILS # BLD: 0 K/UL (ref 0–0.2)
BASOPHILS NFR BLD: 0 % (ref 0–2)
BILIRUB SERPL-MCNC: 1 MG/DL (ref 0–1.2)
BILIRUB UR QL: NEGATIVE
BUN SERPL-MCNC: 7 MG/DL (ref 6–23)
CALCIUM SERPL-MCNC: 9.2 MG/DL (ref 8.8–10.2)
CHLORIDE SERPL-SCNC: 98 MMOL/L (ref 98–107)
CO2 SERPL-SCNC: 22 MMOL/L (ref 20–29)
COLOR UR: ABNORMAL
CREAT SERPL-MCNC: 0.86 MG/DL (ref 0.6–1.1)
DIFFERENTIAL METHOD BLD: ABNORMAL
EOSINOPHIL # BLD: 0.1 K/UL (ref 0–0.8)
EOSINOPHIL NFR BLD: 1 % (ref 0.5–7.8)
EPI CELLS #/AREA URNS HPF: ABNORMAL /HPF
ERYTHROCYTE [DISTWIDTH] IN BLOOD BY AUTOMATED COUNT: 13.8 % (ref 11.9–14.6)
GLOBULIN SER CALC-MCNC: 3.5 G/DL (ref 2.3–3.5)
GLUCOSE SERPL-MCNC: 81 MG/DL (ref 70–99)
GLUCOSE UR STRIP.AUTO-MCNC: NEGATIVE MG/DL
HCG SERPL-ACNC: NORMAL MIU/ML
HCT VFR BLD AUTO: 35 % (ref 35.8–46.3)
HGB BLD-MCNC: 12.3 G/DL (ref 11.7–15.4)
HGB UR QL STRIP: NEGATIVE
IMM GRANULOCYTES # BLD AUTO: 0 K/UL (ref 0–0.5)
IMM GRANULOCYTES NFR BLD AUTO: 0 % (ref 0–5)
KETONES UR QL STRIP.AUTO: 80 MG/DL
LEUKOCYTE ESTERASE UR QL STRIP.AUTO: ABNORMAL
LIPASE SERPL-CCNC: 38 U/L (ref 13–60)
LYMPHOCYTES # BLD: 1.3 K/UL (ref 0.5–4.6)
LYMPHOCYTES NFR BLD: 12 % (ref 13–44)
MCH RBC QN AUTO: 27.8 PG (ref 26.1–32.9)
MCHC RBC AUTO-ENTMCNC: 35.1 G/DL (ref 31.4–35)
MCV RBC AUTO: 79 FL (ref 82–102)
MONOCYTES # BLD: 0.6 K/UL (ref 0.1–1.3)
MONOCYTES NFR BLD: 6 % (ref 4–12)
NEUTS SEG # BLD: 8.4 K/UL (ref 1.7–8.2)
NEUTS SEG NFR BLD: 80 % (ref 43–78)
NITRITE UR QL STRIP.AUTO: NEGATIVE
NRBC # BLD: 0 K/UL (ref 0–0.2)
PH UR STRIP: 6 (ref 5–9)
PLATELET # BLD AUTO: 293 K/UL (ref 150–450)
PMV BLD AUTO: 11.1 FL (ref 9.4–12.3)
POTASSIUM SERPL-SCNC: 3.5 MMOL/L (ref 3.5–5.1)
PROT SERPL-MCNC: 7.5 G/DL (ref 6.3–8.2)
PROT UR STRIP-MCNC: ABNORMAL MG/DL
RBC # BLD AUTO: 4.43 M/UL (ref 4.05–5.2)
RBC #/AREA URNS HPF: ABNORMAL /HPF
SODIUM SERPL-SCNC: 134 MMOL/L (ref 136–145)
SP GR UR REFRACTOMETRY: 1.02 (ref 1–1.02)
UROBILINOGEN UR QL STRIP.AUTO: 0.2 EU/DL (ref 0.2–1)
WBC # BLD AUTO: 10.5 K/UL (ref 4.3–11.1)
WBC URNS QL MICRO: ABNORMAL /HPF

## 2024-12-10 PROCEDURE — 6360000002 HC RX W HCPCS: Performed by: STUDENT IN AN ORGANIZED HEALTH CARE EDUCATION/TRAINING PROGRAM

## 2024-12-10 PROCEDURE — 81001 URINALYSIS AUTO W/SCOPE: CPT

## 2024-12-10 PROCEDURE — 83690 ASSAY OF LIPASE: CPT

## 2024-12-10 PROCEDURE — 76801 OB US < 14 WKS SINGLE FETUS: CPT

## 2024-12-10 PROCEDURE — 99284 EMERGENCY DEPT VISIT MOD MDM: CPT

## 2024-12-10 PROCEDURE — 85025 COMPLETE CBC W/AUTO DIFF WBC: CPT

## 2024-12-10 PROCEDURE — 84702 CHORIONIC GONADOTROPIN TEST: CPT

## 2024-12-10 PROCEDURE — 80053 COMPREHEN METABOLIC PANEL: CPT

## 2024-12-10 PROCEDURE — 96374 THER/PROPH/DIAG INJ IV PUSH: CPT

## 2024-12-10 PROCEDURE — 96361 HYDRATE IV INFUSION ADD-ON: CPT

## 2024-12-10 PROCEDURE — 2580000003 HC RX 258: Performed by: STUDENT IN AN ORGANIZED HEALTH CARE EDUCATION/TRAINING PROGRAM

## 2024-12-10 RX ORDER — ONDANSETRON 4 MG/1
4 TABLET, ORALLY DISINTEGRATING ORAL EVERY 12 HOURS PRN
Qty: 20 TABLET | Refills: 0 | Status: SHIPPED | OUTPATIENT
Start: 2024-12-10 | End: 2024-12-20

## 2024-12-10 RX ORDER — ONDANSETRON 2 MG/ML
4 INJECTION INTRAMUSCULAR; INTRAVENOUS
Status: COMPLETED | OUTPATIENT
Start: 2024-12-10 | End: 2024-12-10

## 2024-12-10 RX ORDER — 0.9 % SODIUM CHLORIDE 0.9 %
1000 INTRAVENOUS SOLUTION INTRAVENOUS
Status: COMPLETED | OUTPATIENT
Start: 2024-12-10 | End: 2024-12-10

## 2024-12-10 RX ADMIN — ONDANSETRON 4 MG: 2 INJECTION, SOLUTION INTRAMUSCULAR; INTRAVENOUS at 08:40

## 2024-12-10 RX ADMIN — SODIUM CHLORIDE 1000 ML: 9 INJECTION, SOLUTION INTRAVENOUS at 08:40

## 2024-12-10 ASSESSMENT — ENCOUNTER SYMPTOMS
VOMITING: 1
WHEEZING: 0
EYE PAIN: 0
SHORTNESS OF BREATH: 0
EYE DISCHARGE: 0
NAUSEA: 1
SORE THROAT: 0
ABDOMINAL PAIN: 1

## 2024-12-10 ASSESSMENT — PAIN - FUNCTIONAL ASSESSMENT
PAIN_FUNCTIONAL_ASSESSMENT: 0-10
PAIN_FUNCTIONAL_ASSESSMENT: 0-10

## 2024-12-10 ASSESSMENT — PAIN SCALES - GENERAL
PAINLEVEL_OUTOF10: 8
PAINLEVEL_OUTOF10: 8
PAINLEVEL_OUTOF10: 6

## 2024-12-10 NOTE — ED PROVIDER NOTES
US OB LESS THAN 14 WEEKS SINGLE OR FIRST GESTATION   Final Result   Single live intrauterine gestation at 7 weeks 3 days. No acute   finding            Electronically signed by Ric Atkinson                   No results for input(s): \"COVID19\" in the last 72 hours.     Voice dictation software was used during the making of this note.  This software is not perfect and grammatical and other typographical errors may be present.  This note has not been completely proofread for errors.     Robert Chang MD  12/10/24 6889

## 2024-12-10 NOTE — ED NOTES
Patient mobility status  with no difficulty.     I have reviewed discharge instructions with the patient.  The patient verbalized understanding.    Patient left ED via Discharge Method: ambulatory to Home with mom.    Opportunity for questions and clarification provided.     Patient given 0 scripts.

## 2024-12-10 NOTE — ED TRIAGE NOTES
Patient states she is 7 weeks pregnant. States she is unable to keep liquids down, decrease appetite and dehydration.     States first pregnancy.

## 2024-12-14 ENCOUNTER — HOSPITAL ENCOUNTER (EMERGENCY)
Age: 19
Discharge: HOME | End: 2024-12-14
Attending: EMERGENCY MEDICINE
Payer: COMMERCIAL

## 2024-12-14 VITALS
RESPIRATION RATE: 12 BRPM | WEIGHT: 120 LBS | OXYGEN SATURATION: 100 % | HEART RATE: 66 BPM | TEMPERATURE: 97.8 F | SYSTOLIC BLOOD PRESSURE: 121 MMHG | DIASTOLIC BLOOD PRESSURE: 78 MMHG | BODY MASS INDEX: 19.29 KG/M2 | HEIGHT: 66 IN

## 2024-12-14 DIAGNOSIS — R11.2 NAUSEA AND VOMITING, UNSPECIFIED VOMITING TYPE: Primary | ICD-10-CM

## 2024-12-14 DIAGNOSIS — E86.0 DEHYDRATION: ICD-10-CM

## 2024-12-14 DIAGNOSIS — Z3A.01 LESS THAN 8 WEEKS GESTATION OF PREGNANCY: ICD-10-CM

## 2024-12-14 LAB
ALBUMIN SERPL-MCNC: 4.6 G/DL (ref 3.5–5)
ALBUMIN/GLOB SERPL: 1.4 (ref 1–1.9)
ALP SERPL-CCNC: 54 U/L (ref 35–104)
ALT SERPL-CCNC: 6 U/L (ref 8–45)
ANION GAP SERPL CALC-SCNC: 16 MMOL/L (ref 7–16)
APPEARANCE UR: ABNORMAL
AST SERPL-CCNC: 17 U/L (ref 15–37)
BACTERIA URNS QL MICRO: ABNORMAL /HPF
BASOPHILS # BLD: 0 K/UL (ref 0–0.2)
BASOPHILS NFR BLD: 0 % (ref 0–2)
BILIRUB SERPL-MCNC: 1.2 MG/DL (ref 0–1.2)
BILIRUB UR QL: NEGATIVE
BUN SERPL-MCNC: 9 MG/DL (ref 6–23)
CALCIUM SERPL-MCNC: 9.7 MG/DL (ref 8.8–10.2)
CASTS URNS QL MICRO: ABNORMAL /LPF
CHLORIDE SERPL-SCNC: 101 MMOL/L (ref 98–107)
CO2 SERPL-SCNC: 20 MMOL/L (ref 20–29)
COLOR UR: ABNORMAL
CREAT SERPL-MCNC: 0.75 MG/DL (ref 0.6–1.1)
DIFFERENTIAL METHOD BLD: ABNORMAL
EOSINOPHIL # BLD: 0 K/UL (ref 0–0.8)
EOSINOPHIL NFR BLD: 0 % (ref 0.5–7.8)
EPI CELLS #/AREA URNS HPF: ABNORMAL /HPF
ERYTHROCYTE [DISTWIDTH] IN BLOOD BY AUTOMATED COUNT: 13.6 % (ref 11.9–14.6)
GLOBULIN SER CALC-MCNC: 3.2 G/DL (ref 2.3–3.5)
GLUCOSE SERPL-MCNC: 110 MG/DL (ref 70–99)
GLUCOSE UR STRIP.AUTO-MCNC: NEGATIVE MG/DL
HCG SERPL-ACNC: NORMAL MIU/ML
HCT VFR BLD AUTO: 35.4 % (ref 35.8–46.3)
HGB BLD-MCNC: 12.3 G/DL (ref 11.7–15.4)
HGB UR QL STRIP: NEGATIVE
IMM GRANULOCYTES # BLD AUTO: 0 K/UL (ref 0–0.5)
IMM GRANULOCYTES NFR BLD AUTO: 0 % (ref 0–5)
KETONES UR QL STRIP.AUTO: 80 MG/DL
LACTATE SERPL-SCNC: 1.7 MMOL/L (ref 0.5–2)
LEUKOCYTE ESTERASE UR QL STRIP.AUTO: NEGATIVE
LIPASE SERPL-CCNC: 76 U/L (ref 13–60)
LYMPHOCYTES # BLD: 1.6 K/UL (ref 0.5–4.6)
LYMPHOCYTES NFR BLD: 13 % (ref 13–44)
MAGNESIUM SERPL-MCNC: 1.9 MG/DL (ref 1.8–2.4)
MCH RBC QN AUTO: 27.2 PG (ref 26.1–32.9)
MCHC RBC AUTO-ENTMCNC: 34.7 G/DL (ref 31.4–35)
MCV RBC AUTO: 78.3 FL (ref 82–102)
MONOCYTES # BLD: 0.7 K/UL (ref 0.1–1.3)
MONOCYTES NFR BLD: 6 % (ref 4–12)
MUCOUS THREADS URNS QL MICRO: ABNORMAL /LPF
NEUTS SEG # BLD: 9.7 K/UL (ref 1.7–8.2)
NEUTS SEG NFR BLD: 81 % (ref 43–78)
NITRITE UR QL STRIP.AUTO: NEGATIVE
NRBC # BLD: 0 K/UL (ref 0–0.2)
PH UR STRIP: 5.5 (ref 5–9)
PLATELET # BLD AUTO: 362 K/UL (ref 150–450)
PMV BLD AUTO: 11.2 FL (ref 9.4–12.3)
POTASSIUM SERPL-SCNC: 3.3 MMOL/L (ref 3.5–5.1)
PROT SERPL-MCNC: 7.7 G/DL (ref 6.3–8.2)
PROT UR STRIP-MCNC: 30 MG/DL
RBC # BLD AUTO: 4.52 M/UL (ref 4.05–5.2)
RBC #/AREA URNS HPF: ABNORMAL /HPF
SODIUM SERPL-SCNC: 137 MMOL/L (ref 136–145)
SP GR UR REFRACTOMETRY: 1.02 (ref 1–1.02)
UROBILINOGEN UR QL STRIP.AUTO: 0.2 EU/DL (ref 0.2–1)
WBC # BLD AUTO: 12.1 K/UL (ref 4.3–11.1)
WBC URNS QL MICRO: ABNORMAL /HPF
YEAST URNS QL MICRO: ABNORMAL

## 2024-12-14 PROCEDURE — 83735 ASSAY OF MAGNESIUM: CPT

## 2024-12-14 PROCEDURE — 83690 ASSAY OF LIPASE: CPT

## 2024-12-14 PROCEDURE — 99284 EMERGENCY DEPT VISIT MOD MDM: CPT

## 2024-12-14 PROCEDURE — 96361 HYDRATE IV INFUSION ADD-ON: CPT

## 2024-12-14 PROCEDURE — 80053 COMPREHEN METABOLIC PANEL: CPT

## 2024-12-14 PROCEDURE — 81001 URINALYSIS AUTO W/SCOPE: CPT

## 2024-12-14 PROCEDURE — 87086 URINE CULTURE/COLONY COUNT: CPT

## 2024-12-14 PROCEDURE — 6370000000 HC RX 637 (ALT 250 FOR IP): Performed by: EMERGENCY MEDICINE

## 2024-12-14 PROCEDURE — 83605 ASSAY OF LACTIC ACID: CPT

## 2024-12-14 PROCEDURE — 96374 THER/PROPH/DIAG INJ IV PUSH: CPT

## 2024-12-14 PROCEDURE — 85025 COMPLETE CBC W/AUTO DIFF WBC: CPT

## 2024-12-14 PROCEDURE — 84702 CHORIONIC GONADOTROPIN TEST: CPT

## 2024-12-14 PROCEDURE — 6360000002 HC RX W HCPCS: Performed by: EMERGENCY MEDICINE

## 2024-12-14 PROCEDURE — 2580000003 HC RX 258: Performed by: EMERGENCY MEDICINE

## 2024-12-14 RX ORDER — METOCLOPRAMIDE 10 MG/1
10 TABLET ORAL 4 TIMES DAILY
Qty: 120 TABLET | Refills: 0 | Status: SHIPPED | OUTPATIENT
Start: 2024-12-14 | End: 2024-12-27 | Stop reason: ALTCHOICE

## 2024-12-14 RX ORDER — 0.9 % SODIUM CHLORIDE 0.9 %
1000 INTRAVENOUS SOLUTION INTRAVENOUS ONCE
Status: COMPLETED | OUTPATIENT
Start: 2024-12-14 | End: 2024-12-14

## 2024-12-14 RX ORDER — NITROFURANTOIN 25; 75 MG/1; MG/1
100 CAPSULE ORAL 2 TIMES DAILY
Qty: 10 CAPSULE | Refills: 0 | Status: SHIPPED | OUTPATIENT
Start: 2024-12-14 | End: 2024-12-19

## 2024-12-14 RX ORDER — ONDANSETRON 8 MG/1
8 TABLET, ORALLY DISINTEGRATING ORAL EVERY 8 HOURS PRN
Qty: 12 TABLET | Refills: 2 | Status: SHIPPED | OUTPATIENT
Start: 2024-12-14

## 2024-12-14 RX ORDER — PROCHLORPERAZINE MALEATE 10 MG
10 TABLET ORAL EVERY 6 HOURS PRN
Qty: 12 TABLET | Refills: 0 | Status: SHIPPED | OUTPATIENT
Start: 2024-12-14 | End: 2024-12-27 | Stop reason: SDUPTHER

## 2024-12-14 RX ORDER — METOCLOPRAMIDE HYDROCHLORIDE 5 MG/ML
10 INJECTION INTRAMUSCULAR; INTRAVENOUS
Status: COMPLETED | OUTPATIENT
Start: 2024-12-14 | End: 2024-12-14

## 2024-12-14 RX ORDER — HYOSCYAMINE SULFATE 0.125 MG
0.25 TABLET ORAL EVERY 6 HOURS PRN
Qty: 20 TABLET | Refills: 1 | Status: SHIPPED | OUTPATIENT
Start: 2024-12-14

## 2024-12-14 RX ORDER — PROCHLORPERAZINE 25 MG
25 SUPPOSITORY, RECTAL RECTAL EVERY 12 HOURS PRN
Qty: 12 SUPPOSITORY | Refills: 1 | Status: SHIPPED | OUTPATIENT
Start: 2024-12-14

## 2024-12-14 RX ADMIN — HYOSCYAMINE SULFATE 0.25 MG: 0.12 TABLET ORAL; SUBLINGUAL at 01:21

## 2024-12-14 RX ADMIN — SODIUM CHLORIDE 1000 ML: 9 INJECTION, SOLUTION INTRAVENOUS at 01:22

## 2024-12-14 RX ADMIN — METOCLOPRAMIDE HYDROCHLORIDE 10 MG: 5 INJECTION INTRAMUSCULAR; INTRAVENOUS at 01:21

## 2024-12-14 ASSESSMENT — ENCOUNTER SYMPTOMS
BLOOD IN STOOL: 0
EYE DISCHARGE: 0
VOMITING: 1
ABDOMINAL PAIN: 1
EYE REDNESS: 0
NAUSEA: 1
COLOR CHANGE: 0
COUGH: 0
DIARRHEA: 0
RHINORRHEA: 0
FACIAL SWELLING: 0
BACK PAIN: 0
SHORTNESS OF BREATH: 0

## 2024-12-14 ASSESSMENT — LIFESTYLE VARIABLES: HOW OFTEN DO YOU HAVE A DRINK CONTAINING ALCOHOL: NEVER

## 2024-12-14 ASSESSMENT — PAIN DESCRIPTION - LOCATION: LOCATION: ABDOMEN

## 2024-12-14 ASSESSMENT — PAIN SCALES - GENERAL: PAINLEVEL_OUTOF10: 8

## 2024-12-14 ASSESSMENT — PAIN - FUNCTIONAL ASSESSMENT: PAIN_FUNCTIONAL_ASSESSMENT: 0-10

## 2024-12-14 NOTE — ED PROVIDER NOTES
Emergency Department Provider Note       PCP: Garry Martines MD   Age: 19 y.o.   Sex: female     DISPOSITION                No diagnosis found.    Medical Decision Making     Hyperemesis gravidarum at 7 weeks gestation.  With no luck using Zofran at home we will try Compazine check labs, hopefully get a urine to assess for ketone levels.     1 acute complicated illness or injury.  {Risk:81061}    I independently ordered and reviewed each unique test.  I reviewed external records: ED visit note from an outside group.  I reviewed external records: previous lab results from outside ED.  I reviewed external records: previous imaging study including radiologist interpretation.   The patients assessment required an independent historian: Mom at bedside.  The reason they were needed is important historical information not provided by the patient.  {test reviewed:49000}  {EK}  {Admitted or Consultants involved.:19905}  {MIPS URI - Strep - Sinusitis - Pregnant - Head Trauma - Overdose - Agitation:77217}  {SEP1 yes/no:91875}  {Critical Care:09958}    History     19-year-old female previously healthy she is G1, P0 at roughly 7 weeks gestation has been having trouble with what seems to be hyperemesis gravidarum for several weeks to other previous recent ER visits she has tried nausea Gummies, Zofran ODT, antinausea bracelets to press on the median nerves, and Unisom/pyridoxine as antiemetics.  Patient is still throwing up uncontrollably and cannot keep any food or liquids down.  There is no diarrhea no UTI symptoms, she has some mild crampy abdominal pain.  There is no vaginal bleeding        ROS     Review of Systems   Constitutional:  Positive for activity change, appetite change and fatigue. Negative for chills and fever.   HENT:  Negative for facial swelling and rhinorrhea.    Eyes:  Negative for discharge and redness.   Respiratory:  Negative for cough and shortness of breath.    Cardiovascular:  Negative for  chest pain and palpitations.   Gastrointestinal:  Positive for abdominal pain, nausea and vomiting. Negative for blood in stool and diarrhea.   Genitourinary:  Positive for decreased urine volume. Negative for difficulty urinating, dysuria and vaginal bleeding.   Musculoskeletal:  Negative for arthralgias and back pain.   Skin:  Negative for color change and pallor.   Neurological:  Positive for dizziness and light-headedness. Negative for headaches.   All other systems reviewed and are negative.       Physical Exam     Vitals signs and nursing note reviewed:  Vitals:    12/14/24 0059   BP: 92/63   Pulse: (!) 153   Resp: 24   Temp: 97.8 °F (36.6 °C)   TempSrc: Oral   SpO2: 98%   Weight: 54.4 kg (120 lb)   Height: 1.676 m (5' 6\")      Physical Exam  Vitals and nursing note reviewed.   Constitutional:       General: She is not in acute distress.     Appearance: Normal appearance. She is normal weight. She is ill-appearing. She is not toxic-appearing or diaphoretic.      Comments: Somnolent, but rouseable   HENT:      Head: Normocephalic and atraumatic.      Right Ear: External ear normal.      Left Ear: External ear normal.      Nose: No rhinorrhea.   Eyes:      General: No scleral icterus.        Right eye: No discharge.         Left eye: No discharge.      Conjunctiva/sclera: Conjunctivae normal.   Cardiovascular:      Rate and Rhythm: Normal rate and regular rhythm.   Pulmonary:      Effort: Pulmonary effort is normal. No respiratory distress.      Breath sounds: Normal breath sounds.   Abdominal:      Palpations: Abdomen is soft. There is no fluid wave or pulsatile mass.      Tenderness: There is no abdominal tenderness. There is no guarding or rebound.   Musculoskeletal:         General: No deformity or signs of injury. Normal range of motion.      Cervical back: Normal range of motion and neck supple. No rigidity.   Skin:     General: Skin is warm and dry.      Coloration: Skin is not jaundiced or pale.  ONDANSETRON (ZOFRAN-ODT) 4 MG DISINTEGRATING TABLET    Take 1 tablet by mouth every 12 hours as needed for Nausea or Vomiting        No results found for any visits on 12/14/24.      No orders to display                No results for input(s): \"COVID19\" in the last 72 hours.    Voice dictation software was used during the making of this note.  This software is not perfect and grammatical and other typographical errors may be present.  This note has not been completely proofread for errors.       Ron Gunn MD  12/26/24 0764

## 2024-12-14 NOTE — ED TRIAGE NOTES
Pt has been seen twice for same thing/ states getting worse/ unable to keep liquids down/ OB appt is in [place]

## 2024-12-14 NOTE — DISCHARGE INSTRUCTIONS
You can use the Zofran for nausea and vomiting,  This can be combined with either the Reglan pills or the Compazine pills (or suppositories)    Just do not use the Reglan and Compazine at the same time    Levsin for abdominal pain and cramps  Drink plenty of fluids  Quit and start the Macrobid antibiotic twice a day for 5 days for what looks like a urinary tract infection

## 2024-12-15 LAB
BACTERIA SPEC CULT: NORMAL
SERVICE CMNT-IMP: NORMAL

## 2024-12-16 LAB
BACTERIA SPEC CULT: NORMAL
SERVICE CMNT-IMP: NORMAL

## 2024-12-26 ENCOUNTER — TELEPHONE (OUTPATIENT)
Dept: OBGYN CLINIC | Age: 19
End: 2024-12-26

## 2024-12-26 NOTE — TELEPHONE ENCOUNTER
Patient LM asking for refill on compazine. Patient states that Dr. Gunn in the ER prescribed it for her. Patient is scheduled for NOB on 01/10/2025.     Please advise.     Spoke with mother of patient who was advised that I would send a message to the provider. The mother stated the patient was discharged from the hospital but if she had to she could take her back. She stated the patients appointment has already been changed and the patient needs to be seen. Patient mother then stated \" she is wondering if they need to switch offices since her appointment keeps changing.\" I advised her that both of my Ob providers are out of the office currently but do have their computer and checks messages periodically. Patient mother voiced understanding. mc

## 2024-12-27 ENCOUNTER — APPOINTMENT (OUTPATIENT)
Dept: ULTRASOUND IMAGING | Age: 19
End: 2024-12-27
Payer: COMMERCIAL

## 2024-12-27 ENCOUNTER — HOSPITAL ENCOUNTER (EMERGENCY)
Age: 19
Discharge: HOME | End: 2024-12-28
Attending: STUDENT IN AN ORGANIZED HEALTH CARE EDUCATION/TRAINING PROGRAM
Payer: COMMERCIAL

## 2024-12-27 VITALS
RESPIRATION RATE: 16 BRPM | DIASTOLIC BLOOD PRESSURE: 71 MMHG | HEIGHT: 66 IN | SYSTOLIC BLOOD PRESSURE: 112 MMHG | OXYGEN SATURATION: 100 % | HEART RATE: 110 BPM | TEMPERATURE: 97.7 F | BODY MASS INDEX: 19.13 KG/M2 | WEIGHT: 119 LBS

## 2024-12-27 DIAGNOSIS — O46.90 VAGINAL BLEEDING IN PREGNANCY: Primary | ICD-10-CM

## 2024-12-27 DIAGNOSIS — N30.00 ACUTE CYSTITIS WITHOUT HEMATURIA: ICD-10-CM

## 2024-12-27 LAB
ABO + RH BLD: NORMAL
ALBUMIN SERPL-MCNC: 4.1 G/DL (ref 3.5–5)
ALBUMIN/GLOB SERPL: 1.3 (ref 1–1.9)
ALP SERPL-CCNC: 57 U/L (ref 35–104)
ALT SERPL-CCNC: 113 U/L (ref 8–45)
ANION GAP SERPL CALC-SCNC: 16 MMOL/L (ref 7–16)
APPEARANCE UR: ABNORMAL
AST SERPL-CCNC: 53 U/L (ref 15–37)
BACTERIA URNS QL MICRO: ABNORMAL /HPF
BASOPHILS # BLD: 0 K/UL (ref 0–0.2)
BASOPHILS NFR BLD: 0 % (ref 0–2)
BILIRUB SERPL-MCNC: 1.2 MG/DL (ref 0–1.2)
BILIRUB UR QL: NEGATIVE
BUN SERPL-MCNC: 8 MG/DL (ref 6–23)
CALCIUM SERPL-MCNC: 9.8 MG/DL (ref 8.8–10.2)
CHLORIDE SERPL-SCNC: 90 MMOL/L (ref 98–107)
CO2 SERPL-SCNC: 26 MMOL/L (ref 20–29)
COLOR UR: ABNORMAL
CREAT SERPL-MCNC: 0.64 MG/DL (ref 0.6–1.1)
DIFFERENTIAL METHOD BLD: ABNORMAL
EOSINOPHIL # BLD: 0 K/UL (ref 0–0.8)
EOSINOPHIL NFR BLD: 0 % (ref 0.5–7.8)
EPI CELLS #/AREA URNS HPF: ABNORMAL /HPF
ERYTHROCYTE [DISTWIDTH] IN BLOOD BY AUTOMATED COUNT: 13.7 % (ref 11.9–14.6)
GLOBULIN SER CALC-MCNC: 3.2 G/DL (ref 2.3–3.5)
GLUCOSE SERPL-MCNC: 106 MG/DL (ref 70–99)
GLUCOSE UR STRIP.AUTO-MCNC: NEGATIVE MG/DL
HCG SERPL-ACNC: NORMAL MIU/ML
HCT VFR BLD AUTO: 37.4 % (ref 35.8–46.3)
HGB BLD-MCNC: 13.3 G/DL (ref 11.7–15.4)
HGB UR QL STRIP: ABNORMAL
IMM GRANULOCYTES # BLD AUTO: 0 K/UL (ref 0–0.5)
IMM GRANULOCYTES NFR BLD AUTO: 0 % (ref 0–5)
KETONES UR QL STRIP.AUTO: 80 MG/DL
LEUKOCYTE ESTERASE UR QL STRIP.AUTO: ABNORMAL
LYMPHOCYTES # BLD: 1.3 K/UL (ref 0.5–4.6)
LYMPHOCYTES NFR BLD: 10 % (ref 13–44)
MAGNESIUM SERPL-MCNC: 2 MG/DL (ref 1.8–2.4)
MCH RBC QN AUTO: 27.3 PG (ref 26.1–32.9)
MCHC RBC AUTO-ENTMCNC: 35.6 G/DL (ref 31.4–35)
MCV RBC AUTO: 76.6 FL (ref 82–102)
MONOCYTES # BLD: 0.9 K/UL (ref 0.1–1.3)
MONOCYTES NFR BLD: 7 % (ref 4–12)
NEUTS SEG # BLD: 10.2 K/UL (ref 1.7–8.2)
NEUTS SEG NFR BLD: 82 % (ref 43–78)
NITRITE UR QL STRIP.AUTO: NEGATIVE
NRBC # BLD: 0 K/UL (ref 0–0.2)
PH UR STRIP: 6 (ref 5–9)
PLATELET # BLD AUTO: 310 K/UL (ref 150–450)
PMV BLD AUTO: 11.8 FL (ref 9.4–12.3)
POTASSIUM SERPL-SCNC: 3.2 MMOL/L (ref 3.5–5.1)
PROT SERPL-MCNC: 7.3 G/DL (ref 6.3–8.2)
PROT UR STRIP-MCNC: 30 MG/DL
RBC # BLD AUTO: 4.88 M/UL (ref 4.05–5.2)
RBC #/AREA URNS HPF: ABNORMAL /HPF
SODIUM SERPL-SCNC: 132 MMOL/L (ref 136–145)
SP GR UR REFRACTOMETRY: 1.02 (ref 1–1.02)
TROPONIN T SERPL HS-MCNC: <6 NG/L (ref 0–14)
TROPONIN T SERPL HS-MCNC: <6 NG/L (ref 0–14)
UROBILINOGEN UR QL STRIP.AUTO: 1 EU/DL (ref 0.2–1)
WBC # BLD AUTO: 12.5 K/UL (ref 4.3–11.1)
WBC URNS QL MICRO: ABNORMAL /HPF

## 2024-12-27 PROCEDURE — 83735 ASSAY OF MAGNESIUM: CPT

## 2024-12-27 PROCEDURE — 6360000002 HC RX W HCPCS: Performed by: STUDENT IN AN ORGANIZED HEALTH CARE EDUCATION/TRAINING PROGRAM

## 2024-12-27 PROCEDURE — 93005 ELECTROCARDIOGRAM TRACING: CPT | Performed by: STUDENT IN AN ORGANIZED HEALTH CARE EDUCATION/TRAINING PROGRAM

## 2024-12-27 PROCEDURE — 84702 CHORIONIC GONADOTROPIN TEST: CPT

## 2024-12-27 PROCEDURE — 81001 URINALYSIS AUTO W/SCOPE: CPT

## 2024-12-27 PROCEDURE — 86901 BLOOD TYPING SEROLOGIC RH(D): CPT

## 2024-12-27 PROCEDURE — 2580000003 HC RX 258: Performed by: STUDENT IN AN ORGANIZED HEALTH CARE EDUCATION/TRAINING PROGRAM

## 2024-12-27 PROCEDURE — 85025 COMPLETE CBC W/AUTO DIFF WBC: CPT

## 2024-12-27 PROCEDURE — 99284 EMERGENCY DEPT VISIT MOD MDM: CPT

## 2024-12-27 PROCEDURE — 86900 BLOOD TYPING SEROLOGIC ABO: CPT

## 2024-12-27 PROCEDURE — 76815 OB US LIMITED FETUS(S): CPT

## 2024-12-27 PROCEDURE — 96374 THER/PROPH/DIAG INJ IV PUSH: CPT

## 2024-12-27 PROCEDURE — 6370000000 HC RX 637 (ALT 250 FOR IP): Performed by: STUDENT IN AN ORGANIZED HEALTH CARE EDUCATION/TRAINING PROGRAM

## 2024-12-27 PROCEDURE — 96361 HYDRATE IV INFUSION ADD-ON: CPT

## 2024-12-27 PROCEDURE — 84484 ASSAY OF TROPONIN QUANT: CPT

## 2024-12-27 PROCEDURE — 80053 COMPREHEN METABOLIC PANEL: CPT

## 2024-12-27 RX ORDER — CEPHALEXIN 500 MG/1
500 CAPSULE ORAL 4 TIMES DAILY
Qty: 28 CAPSULE | Refills: 0 | Status: SHIPPED | OUTPATIENT
Start: 2024-12-27 | End: 2025-01-03

## 2024-12-27 RX ORDER — PROCHLORPERAZINE MALEATE 10 MG
10 TABLET ORAL EVERY 6 HOURS PRN
Qty: 30 TABLET | Refills: 2 | Status: SHIPPED | OUTPATIENT
Start: 2024-12-27 | End: 2024-12-27

## 2024-12-27 RX ORDER — ONDANSETRON 2 MG/ML
4 INJECTION INTRAMUSCULAR; INTRAVENOUS
Status: COMPLETED | OUTPATIENT
Start: 2024-12-27 | End: 2024-12-27

## 2024-12-27 RX ORDER — SODIUM CHLORIDE, SODIUM LACTATE, POTASSIUM CHLORIDE, AND CALCIUM CHLORIDE .6; .31; .03; .02 G/100ML; G/100ML; G/100ML; G/100ML
1000 INJECTION, SOLUTION INTRAVENOUS ONCE
Status: COMPLETED | OUTPATIENT
Start: 2024-12-27 | End: 2024-12-28

## 2024-12-27 RX ORDER — PROCHLORPERAZINE MALEATE 10 MG
10 TABLET ORAL EVERY 6 HOURS PRN
Qty: 30 TABLET | Refills: 0 | Status: SHIPPED | OUTPATIENT
Start: 2024-12-27

## 2024-12-27 RX ORDER — CEPHALEXIN 500 MG/1
500 CAPSULE ORAL
Status: COMPLETED | OUTPATIENT
Start: 2024-12-27 | End: 2024-12-27

## 2024-12-27 RX ADMIN — SODIUM CHLORIDE, POTASSIUM CHLORIDE, SODIUM LACTATE AND CALCIUM CHLORIDE 1000 ML: 600; 310; 30; 20 INJECTION, SOLUTION INTRAVENOUS at 19:40

## 2024-12-27 RX ADMIN — ONDANSETRON 4 MG: 2 INJECTION, SOLUTION INTRAMUSCULAR; INTRAVENOUS at 19:40

## 2024-12-27 RX ADMIN — CEPHALEXIN 500 MG: 500 CAPSULE ORAL at 23:54

## 2024-12-27 ASSESSMENT — PAIN - FUNCTIONAL ASSESSMENT: PAIN_FUNCTIONAL_ASSESSMENT: 0-10

## 2024-12-27 ASSESSMENT — ENCOUNTER SYMPTOMS
NAUSEA: 0
SORE THROAT: 0
WHEEZING: 0
VOMITING: 0
SHORTNESS OF BREATH: 0
ABDOMINAL PAIN: 1
EYE PAIN: 0
EYE DISCHARGE: 0

## 2024-12-27 ASSESSMENT — PAIN SCALES - GENERAL
PAINLEVEL_OUTOF10: 3
PAINLEVEL_OUTOF10: 8

## 2024-12-27 ASSESSMENT — PAIN DESCRIPTION - LOCATION: LOCATION: ABDOMEN

## 2024-12-27 NOTE — TELEPHONE ENCOUNTER
Rx sent for compazine. I see reglan is also on her med list. I do not recommend taking both of these. D/c reglan (if taken together can cause neurologic symptoms)

## 2024-12-27 NOTE — ED TRIAGE NOTES
Patient to triage with parent with dark red vaginal bleeding and lower abdominal pain, nausea/vomiting    LMP10/20

## 2024-12-28 LAB
EKG ATRIAL RATE: 131 BPM
EKG DIAGNOSIS: NORMAL
EKG P AXIS: 86 DEGREES
EKG P-R INTERVAL: 124 MS
EKG Q-T INTERVAL: 296 MS
EKG QRS DURATION: 82 MS
EKG QTC CALCULATION (BAZETT): 437 MS
EKG R AXIS: 95 DEGREES
EKG T AXIS: -20 DEGREES
EKG VENTRICULAR RATE: 131 BPM

## 2024-12-28 PROCEDURE — 93010 ELECTROCARDIOGRAM REPORT: CPT | Performed by: INTERNAL MEDICINE

## 2024-12-28 PROCEDURE — 96361 HYDRATE IV INFUSION ADD-ON: CPT

## 2024-12-28 NOTE — ED PROVIDER NOTES
Emergency Department Provider Note       PCP: Garry Martines MD   Age: 19 y.o.   Sex: female     DISPOSITION Decision To Discharge 12/27/2024 11:40:11 PM    ICD-10-CM    1. Vaginal bleeding in pregnancy  O46.90 [facility] - Silvia Voss DO, OB/GYN, [place]      2. Acute cystitis without hematuria  N30.00           Medical Decision Making     19-year-old female presents to the ED for abdominal pain in pregnancy.  Vital signs initially demonstrate significant tachycardia in the 140s.  Maintaining normal blood pressure.  EKG shows sinus tachycardia, no ST elevation or lethal dysrhythmia.  No significant hypoxia, shortness of breath or increased respiratory rate to suggest PE, do not believe she requires CT angiogram.  Patient given IV fluids and antiemetics with significant improvement of heart rate.  Patient actually has heart rate of 98 upon my repeat evaluation.  Patient given IV Zofran with significant improvement of nausea/vomiting as well.  Ultrasound obtained that demonstrates IUP with fetal heart tones 177, slightly elevated but likely due to mothers tachycardia as well.  UA still demonstrates bacteriuria.  Was prescribed Macrobid last time she was here, but she cannot tolerate this.  Patient also has allergy to amoxicillin that mother reports.  Has listed allergy to cefdinir, but mother is unsure of this.  Will attempt 1 dose of Keflex while in emergency department to see if she tolerates this to send her home with.  Also states that the only medication that she responsive for her nausea is Compazine.  Will send her home with a very short dose but instructed not to take any more than she needs to, and to follow-up with OB/GYN as soon as possible.  Patient is O+, does not require RhoGAM at this time.  No significant electrolyte derangement.  Hemodynamically stable with significantly improved heart rate and symptoms at time of discharge.    Patient was instructed to follow-up with PCP in the next 24 to  Immature Granulocytes % 0 0.0 - 5.0 %    Neutrophils Absolute 10.2 (H) 1.7 - 8.2 K/UL    Lymphocytes Absolute 1.3 0.5 - 4.6 K/UL    Monocytes Absolute 0.9 0.1 - 1.3 K/UL    Eosinophils Absolute 0.0 0.0 - 0.8 K/UL    Basophils Absolute 0.0 0.0 - 0.2 K/UL    Immature Granulocytes Absolute 0.0 0.0 - 0.5 K/UL   Comprehensive Metabolic Panel w/ Reflex to MG   Result Value Ref Range    Sodium 132 (L) 136 - 145 mmol/L    Potassium 3.2 (L) 3.5 - 5.1 mmol/L    Chloride 90 (L) 98 - 107 mmol/L    CO2 26 20 - 29 mmol/L    Anion Gap 16 7 - 16 mmol/L    Glucose 106 (H) 70 - 99 mg/dL    BUN 8 6 - 23 MG/DL    Creatinine 0.64 0.60 - 1.10 MG/DL    Est, Glom Filt Rate >90 >60 ml/min/1.73m2    Calcium 9.8 8.8 - 10.2 MG/DL    Total Bilirubin 1.2 0.0 - 1.2 MG/DL     (H) 8 - 45 U/L    AST 53 (H) 15 - 37 U/L    Alk Phosphatase 57 35 - 104 U/L    Total Protein 7.3 6.3 - 8.2 g/dL    Albumin 4.1 3.5 - 5.0 g/dL    Globulin 3.2 2.3 - 3.5 g/dL    Albumin/Globulin Ratio 1.3 1.0 - 1.9     Troponin now then q90 min for 2 occurances   Result Value Ref Range    Troponin T <6.0 0 - 14 ng/L   Troponin now then q90 min for 2 occurances   Result Value Ref Range    Troponin T <6.0 0 - 14 ng/L   Magnesium   Result Value Ref Range    Magnesium 2.0 1.8 - 2.4 mg/dL   Urinalysis   Result Value Ref Range    Color, UA DARK YELLOW      Appearance CLOUDY      Specific Gravity, UA 1.022 1.001 - 1.023      pH, Urine 6.0 5.0 - 9.0      Protein, UA 30 (A) NEG mg/dL    Glucose, Ur Negative NEG mg/dL    Ketones, Urine 80 (A) NEG mg/dL    Bilirubin, Urine Negative NEG      Blood, Urine MODERATE (A) NEG      Urobilinogen, Urine 1.0 0.2 - 1.0 EU/dL    Nitrite, Urine Negative NEG      Leukocyte Esterase, Urine TRACE (A) NEG      WBC, UA 5-10 0 /hpf    RBC, UA 3-5 0 /hpf    Epithelial Cells, UA 5-10 0 /hpf    BACTERIA, URINE 2+ (H) 0 /hpf   HCG, Quantitative, Pregnancy   Result Value Ref Range    hCG Quant 225,448 MIU/ML   EKG 12 Lead   Result Value Ref Range

## 2024-12-28 NOTE — ED NOTES
I have reviewed discharge instructions with the patient.  The patient verbalized understanding.    Patient left ED via Discharge Method: ambulatory to Home with family.    Opportunity for questions and clarification provided.       Patient given 2 scripts.         To continue your aftercare when you leave the hospital, you may receive an automated call from our care team to check in on how you are doing.  This is a free service and part of our promise to provide the best care and service to meet your aftercare needs.” If you have questions, or wish to unsubscribe from this service please call [phone].  Thank you for Choosing our [facility] Emergency Department.

## 2024-12-30 NOTE — TELEPHONE ENCOUNTER
I would have the  add her to cancellation list. If we have anything earlier we can move her up.    N/V: Did she get rx for compazine? Hopefully N/V improved once restarting this. If not, let us know and we can additional meds if needed    Vag spottin% of pregnancy can experience spotting unfortunately. Thankfully she had an ultrasound on 12/10 and  which confirmed intrauterine pregnancy (no evidence of miscarriage or ectopic which can cause spotting). When we see her, we will do a swab to rule out any vaginal infections. The UTI can also cause spotting so let us know if she cannot keep down antibiotic now that she's back on compazine    Focus mostly on hydration: carbonated ginger ale, gatorade, popcicles, italian ice, broth, gatorade/pedialite, etc.

## 2024-12-30 NOTE — TELEPHONE ENCOUNTER
Called pt, thought I was speaking to pt but it was her mom. She stated that they went to the ER again on 12/27 because she was spotting and had UTI but is still having dark red/brown spotting. She asked what could cause the spotting, advised her I will need to get Paloma to look over ER note to see. She stated they ran out of the Compazine, advised her of new rx and if there are any issues getting it from the pharmacy please let us know. She stated the Compazine is the only medication that has helped. She stated she is still not able to keep anything down. They did start her on antibiotics for UTI and she took a dose in the ER and was able to keep that down but on Saturday she could not keep the medication down. Mother stated she is breaking the pill open and putting it in applesauce since there is not a liquid form. Mother is frustrated because she feels like no one is helping and the appt keeps getting moved, stated the original appt was on 01/02. She stated she was in the process of finding another doctor since we keep moving her appt (appt has been moved once). Mother stated she is about to pay out of pocket for her to get an US done somewhere else. Advised the mother that we do have an opening at 8:00am for US and then she can see Paloma at 8:30am. She stated she can not get off work and the pt does not want to come by herself. She stated pt really needs to be seen on the 2nd. Advised her that I can hold the spots but she will need to call and let us know if she is able to do that appt. Advised her that I will send this to Paloma as well, but Paloma is not in office so I am unsure if she is on her computer at the moment. She stated understanding.

## 2024-12-31 NOTE — TELEPHONE ENCOUNTER
Called pt, pt answered this time. Message below reviewed with pt. Pt stated she has not started the compazine yet and she is unsure if her mom picked up the rx. Informed pt that she will need to pick that up and start it ASAP. Also pt declined being put on cancellation list because the 10th is the only day someone can take her. Informed pt to call us if medication does not help. Pt voiced understanding.

## 2025-01-07 ENCOUNTER — TELEPHONE (OUTPATIENT)
Dept: OBGYN CLINIC | Age: 20
End: 2025-01-07

## 2025-01-07 NOTE — TELEPHONE ENCOUNTER
Patient left voicemail asking for appointment.  Called patient back and left voicemail asking for return call.

## 2025-01-10 ENCOUNTER — PROCEDURE VISIT (OUTPATIENT)
Dept: OBGYN CLINIC | Age: 20
End: 2025-01-10

## 2025-01-10 ENCOUNTER — ROUTINE PRENATAL (OUTPATIENT)
Dept: OBGYN CLINIC | Age: 20
End: 2025-01-10

## 2025-01-10 VITALS
HEIGHT: 66 IN | WEIGHT: 112 LBS | DIASTOLIC BLOOD PRESSURE: 68 MMHG | BODY MASS INDEX: 18 KG/M2 | SYSTOLIC BLOOD PRESSURE: 102 MMHG

## 2025-01-10 DIAGNOSIS — B00.9 HSV (HERPES SIMPLEX VIRUS) INFECTION: ICD-10-CM

## 2025-01-10 DIAGNOSIS — Z34.81 ENCOUNTER FOR SUPERVISION OF OTHER NORMAL PREGNANCY IN FIRST TRIMESTER: ICD-10-CM

## 2025-01-10 DIAGNOSIS — Z34.01 PRIMIGRAVIDA IN FIRST TRIMESTER: ICD-10-CM

## 2025-01-10 DIAGNOSIS — Z34.01 PRIMIGRAVIDA IN FIRST TRIMESTER: Primary | ICD-10-CM

## 2025-01-10 DIAGNOSIS — Z34.01 ENCOUNTER FOR SUPERVISION OF NORMAL FIRST PREGNANCY IN FIRST TRIMESTER: ICD-10-CM

## 2025-01-10 DIAGNOSIS — O36.80X0 ENCOUNTER TO DETERMINE FETAL VIABILITY OF PREGNANCY, SINGLE OR UNSPECIFIED FETUS: Primary | ICD-10-CM

## 2025-01-10 PROBLEM — N89.8 VAGINAL DISCHARGE: Status: RESOLVED | Noted: 2024-10-02 | Resolved: 2025-01-10

## 2025-01-10 PROBLEM — Z72.51 UNPROTECTED SEX: Status: RESOLVED | Noted: 2024-10-02 | Resolved: 2025-01-10

## 2025-01-10 PROBLEM — M54.50 ACUTE BILATERAL LOW BACK PAIN WITHOUT SCIATICA: Status: RESOLVED | Noted: 2021-02-14 | Resolved: 2025-01-10
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
## 2025-01-10 PROBLEM — M54.2 CERVICALGIA: Status: RESOLVED | Noted: 2021-02-14 | Resolved: 2025-01-10

## 2025-01-10 LAB
ABO + RH BLD: NORMAL
AMPHET UR QL SCN: NEGATIVE
BARBITURATES UR QL SCN: NEGATIVE
BENZODIAZ UR QL: NEGATIVE
BLOOD GROUP ANTIBODIES SERPL: NORMAL
CANNABINOIDS UR QL SCN: NEGATIVE
COCAINE UR QL SCN: NEGATIVE
EST. AVERAGE GLUCOSE BLD GHB EST-MCNC: 107 MG/DL
HBA1C MFR BLD: 5.3 % (ref 0–5.6)
HBV SURFACE AG SER QL: NONREACTIVE
HCV AB SER QL: NONREACTIVE
HIV 1+2 AB+HIV1 P24 AG SERPL QL IA: NONREACTIVE
HIV 1/2 RESULT COMMENT: NORMAL
METHADONE UR QL: NEGATIVE
OPIATES UR QL: NEGATIVE
PCP UR QL: NEGATIVE
RUBV IGG SERPL IA-ACNC: 288 IU/ML
T PALLIDUM AB SER QL IA: NONREACTIVE

## 2025-01-10 RX ORDER — PROCHLORPERAZINE MALEATE 10 MG
10 TABLET ORAL EVERY 6 HOURS PRN
Qty: 30 TABLET | Refills: 2 | Status: SHIPPED | OUTPATIENT
Start: 2025-01-10

## 2025-01-10 RX ORDER — ACETAMINOPHEN 325 MG/1
650 TABLET ORAL EVERY 6 HOURS PRN
COMMUNITY

## 2025-01-10 RX ORDER — ONDANSETRON 4 MG/1
TABLET, ORALLY DISINTEGRATING ORAL
COMMUNITY
Start: 2024-12-26

## 2025-01-10 RX ORDER — CEPHALEXIN 500 MG/1
500 CAPSULE ORAL 4 TIMES DAILY
COMMUNITY
End: 2025-01-10 | Stop reason: ALTCHOICE

## 2025-01-10 RX ORDER — METOCLOPRAMIDE 10 MG/1
10 TABLET ORAL 4 TIMES DAILY
COMMUNITY

## 2025-01-10 RX ORDER — PROMETHAZINE HYDROCHLORIDE 12.5 MG/1
12.5 TABLET ORAL EVERY 6 HOURS PRN
Qty: 28 TABLET | Refills: 1 | Status: SHIPPED | OUTPATIENT
Start: 2025-01-10 | End: 2025-01-24

## 2025-01-10 NOTE — PROGRESS NOTES
Patient comes in today for initial prenatal visit. Patients mother states that patient can only tolerate the compazine. She stated she has been alternating compazine and Reglan for the patient. She stated the Zofran does not work for patient. Patient mother states she has been to the ER several times for N/V.  Patient mother states that the patient has not finished the Keflex due to not tolerating it.      Patient mother stated they could not get the Diclegis due to cost and that the rx was not cleared.     Fetal Movements:  No  Contractions:  No  Vaginal Bleeding:  No  Leaking Fluid:  No  GI/ issues:  Yes, N/V    Drug/Alcohol 4P's Plus Screening    1.  Have either of your parents ever had a problem with drugs/alcohol/prescription drugs? No  2.  Does your partner have a problem with drugs/alcohol/prescription drugs?  No  3.  In the past, have you ever had a problem with drugs/alcohol/prescription drugs?  No  4.  Before you were pregnant, in the past month, have you done any drugs, drank any alcohol or abused any prescription drugs?    No  If \"YES\" to any of the above, please give further details:  N/A    LAST PAP:  Never    LAST MAMMO:  Never    LMP:  Patient's last menstrual period was 10/20/2024.    FAMILY HISTORY OF:   Breast Cancer:  Yes   Ovarian Cancer:  No   Uterine Cancer:  Yes   Colon Cancer:  No    Vitals:    01/10/25 0941   BP: 102/68   Site: Right Upper Arm   Position: Sitting   Weight: 50.8 kg (112 lb)   Height: 1.676 m (5' 6\")        Deepa Birch MA  01/10/25  10:00 AM

## 2025-01-10 NOTE — ASSESSMENT & PLAN NOTE
Instructed pt to contact the office or seek immediate care if develops fever > 101.0, severe lower abdominal pain or heavy vaginal bleeding (soaking 2 or more pads per hour).    PNLs, new OB packet today

## 2025-01-10 NOTE — PROGRESS NOTES
Chief Complaint   Patient presents with    Initial Prenatal Visit    Pregnancy Ultrasound        This 20 y.o.  at 11w5d with Estimated Date of Delivery: 25 presents for routine prenatal visit. Patient has some complaints today. Pt reports no LOF, VB, ctx. Pt denies H/A, vision changes, abdom pain, (+) N/V.    Vitals:    01/10/25 0941   BP: 102/68   Site: Right Upper Arm   Position: Sitting   Weight: 50.8 kg (112 lb)   Height: 1.676 m (5' 6\")      Prenatal Physical       OB Prenatal Exam: Last filed by Karthik Brand MD on 1/10/2025 10:14 AM       General Physical Exam       HEENT: normal  Heart: normal  Skin: normal     Thyroid: normal  Lungs: normal  Extremities: normal     Lymph Nodes: normal  Abdomen: normal                                Patient Active Problem List    Diagnosis Date Noted    Primigravida in first trimester 01/10/2025     Overview Note:     EDC by LMP confirmed by  week US       Assessment & Plan Note:     Instructed pt to contact the office or seek immediate care if develops fever > 101.0, severe lower abdominal pain or heavy vaginal bleeding (soaking 2 or more pads per hour).    PNLs, new OB packet today      HSV (herpes simplex virus) infection 10/02/2024      Problem List Items Addressed This Visit       Primigravida in first trimester - Primary     Instructed pt to contact the office or seek immediate care if develops fever > 101.0, severe lower abdominal pain or heavy vaginal bleeding (soaking 2 or more pads per hour).    PNLs, new OB packet today             Karthik Brand MD   10:14 AM  01/10/25

## 2025-01-14 LAB
C TRACH RRNA SPEC QL NAA+PROBE: NEGATIVE
N GONORRHOEA RRNA SPEC QL NAA+PROBE: NEGATIVE
SPECIMEN SOURCE: NORMAL
T VAGINALIS RRNA SPEC QL NAA+PROBE: NEGATIVE

## 2025-01-16 LAB — HGB FRACT BLD ELPH-IMP: NORMAL

## 2025-01-17 LAB
Lab: NORMAL
NTRA FETAL FRACTION: NORMAL
NTRA FETAL RHD SUMMARY: NORMAL
NTRA GENDER OF FETUS: NORMAL
NTRA MONOSOMY X AGE-BASED RISK TEXT: NORMAL
NTRA MONOSOMY X RESULT TEXT: NORMAL
NTRA MONOSOMY X RISK SCORE TEXT: NORMAL
NTRA TRIPLOIDY RESULT TEXT: NORMAL
NTRA TRISOMY 13 AGE-BASED RISK TEXT: NORMAL
NTRA TRISOMY 13 RESULT TEXT: NORMAL
NTRA TRISOMY 13 RISK SCORE TEXT: NORMAL
NTRA TRISOMY 18 AGE-BASED RISK TEXT: NORMAL
NTRA TRISOMY 18 RESULT TEXT: NORMAL
NTRA TRISOMY 18 RISK SCORE TEXT: NORMAL
NTRA TRISOMY 21 AGE-BASED RISK TEXT: NORMAL
NTRA TRISOMY 21 RESULT TEXT: NORMAL
NTRA TRISOMY 21 RISK SCORE TEXT: NORMAL

## 2025-01-22 LAB
Lab: NEGATIVE
Lab: NORMAL
NTRA CYSTIC FIBROSIS: NEGATIVE
NTRA DUCHENNE/BECKER MUSCULAR DYSTROPHY: NEGATIVE
NTRA FRAGILE X SYNDROME: NEGATIVE
NTRA SPINAL MUSCULAR ATROPHY: NEGATIVE

## 2025-01-23 ENCOUNTER — TELEPHONE (OUTPATIENT)
Dept: OBGYN CLINIC | Age: 20
End: 2025-01-23

## 2025-01-23 NOTE — TELEPHONE ENCOUNTER
----- Message from Dr. Karthik Brand MD sent at 1/23/2025  8:18 AM EST -----  Please let pt know that all her genetic testing was normal/negative  If she wants to know -- it's a GIRL

## 2025-01-23 NOTE — TELEPHONE ENCOUNTER
Patient informed of results. Patient states she seen the gender on My Chart. Patient voiced understanding. mary carmen

## 2025-02-21 ENCOUNTER — ROUTINE PRENATAL (OUTPATIENT)
Dept: OBGYN CLINIC | Age: 20
End: 2025-02-21

## 2025-02-21 VITALS
WEIGHT: 114 LBS | BODY MASS INDEX: 18.32 KG/M2 | SYSTOLIC BLOOD PRESSURE: 104 MMHG | HEIGHT: 66 IN | DIASTOLIC BLOOD PRESSURE: 70 MMHG

## 2025-02-21 DIAGNOSIS — Z34.02 PRIMIGRAVIDA IN SECOND TRIMESTER: ICD-10-CM

## 2025-02-21 DIAGNOSIS — B00.9 HSV (HERPES SIMPLEX VIRUS) INFECTION: ICD-10-CM

## 2025-02-21 DIAGNOSIS — Z34.02 PRIMIGRAVIDA IN SECOND TRIMESTER: Primary | ICD-10-CM

## 2025-02-21 NOTE — PROGRESS NOTES
Chief Complaint   Patient presents with    Routine Prenatal Visit        This 20 y.o.  at 17w5d with Estimated Date of Delivery: 25 presents for routine prenatal visit. Patient has no complaints today. Pt reports good FM, no LOF, VB, ctx. Pt denies H/A, vision changes, abdom pain, N/V.    Vitals:    25 1014   BP: 104/70   Site: Left Upper Arm   Position: Sitting   Weight: 51.7 kg (114 lb)   Height: 1.676 m (5' 6\")        Patient Active Problem List    Diagnosis Date Noted    Primigravida in second trimester 01/10/2025     Overview Note:     EDC by LMP confirmed by  week US    1/10/25:  NIPT low risk, FEMALE, CF, SMA, DMD, Fragile X all neg       Assessment & Plan Note:     Instructed pt to contact the office or seek immediate care if develops fever > 101.0, severe lower abdominal pain or heavy vaginal bleeding (soaking 2 or more pads per hour).         HSV (herpes simplex virus) infection 10/02/2024     Overview Note:     HSV 1 - no known outbreaks       Assessment & Plan Note:     No prodrome or lesion per pt        Problem List Items Addressed This Visit       Primigravida in second trimester - Primary     Instructed pt to contact the office or seek immediate care if develops fever > 101.0, severe lower abdominal pain or heavy vaginal bleeding (soaking 2 or more pads per hour).            Relevant Orders    Alpha Fetoprotein, Maternal    HSV (herpes simplex virus) infection     No prodrome or lesion per pt             Karthik Brand MD   10:23 AM  25

## 2025-02-21 NOTE — ASSESSMENT & PLAN NOTE
Instructed pt to contact the office or seek immediate care if develops fever > 101.0, severe lower abdominal pain or heavy vaginal bleeding (soaking 2 or more pads per hour).

## 2025-02-21 NOTE — PROGRESS NOTES
Patient comes in today for routine prenatal visit. No complaints/concerns today.     Fetal Movement: Yes  Contractions: No  Vaginal Bleeding: No  Leaking Fluid: No  GI/: No    Vitals:    02/21/25 1014   BP: 104/70   Site: Left Upper Arm   Position: Sitting   Weight: 51.7 kg (114 lb)   Height: 1.676 m (5' 6\")

## 2025-02-24 LAB
AFP INTERP SERPL-IMP: NORMAL
AFP MOM SERPL: 1.13
AFP SERPL-MCNC: 56.3 NG/ML
AGE AT DELIVERY: 21.5 YR
COMMENT: NORMAL
DONOR EGG?: NO
GA METHOD: NORMAL
GA: 17.7 WEEKS
IDDM PATIENT QL: NO
Lab: 114
Lab: NORMAL
MAT SCN FOR FETAL ABNORMALITIES SERPL: NORMAL
MULTIPLE PREGNANCY: NO
NEURAL TUBE DEFECT RISK FETUS: 8106
NUMBER OF FETUSES: NO
OTHER INDICATIONS: NO
PREVIOUSLY ELEVATED AFP (Y OR N): 17.5
PREVIOUSLY ELEVATED AFP (Y OR N): NO
PRIOR 1ST TRIM TESTING ?: NO
PRIOR 2ND TRIM TESTING ?: NO
PRIOR DS/NTD SCREEN CURRENT PREGNANCY?: NO
PRIOR PREGNANCY WITH DOWN SYNDROME (Y OR N): 1
PRIOR PREGNANCY WITH DOWN SYNDROME (Y OR N): NO
TYPE OF EGG DONOR: NORMAL

## 2025-03-14 ENCOUNTER — ROUTINE PRENATAL (OUTPATIENT)
Dept: OBGYN CLINIC | Age: 20
End: 2025-03-14

## 2025-03-14 ENCOUNTER — PROCEDURE VISIT (OUTPATIENT)
Dept: OBGYN CLINIC | Age: 20
End: 2025-03-14
Payer: COMMERCIAL

## 2025-03-14 VITALS
WEIGHT: 120 LBS | BODY MASS INDEX: 19.29 KG/M2 | HEIGHT: 66 IN | SYSTOLIC BLOOD PRESSURE: 106 MMHG | DIASTOLIC BLOOD PRESSURE: 64 MMHG

## 2025-03-14 DIAGNOSIS — Z34.02 PRIMIGRAVIDA IN SECOND TRIMESTER: ICD-10-CM

## 2025-03-14 DIAGNOSIS — B00.9 HSV (HERPES SIMPLEX VIRUS) INFECTION: ICD-10-CM

## 2025-03-14 DIAGNOSIS — Z36.89 ENCOUNTER FOR FETAL ANATOMIC SURVEY: Primary | ICD-10-CM

## 2025-03-14 DIAGNOSIS — J45.909 ASTHMA DURING PREGNANCY: Primary | ICD-10-CM

## 2025-03-14 DIAGNOSIS — O99.519 ASTHMA DURING PREGNANCY: Primary | ICD-10-CM

## 2025-03-14 PROCEDURE — 76805 OB US >/= 14 WKS SNGL FETUS: CPT | Performed by: OBSTETRICS & GYNECOLOGY

## 2025-03-14 RX ORDER — ALBUTEROL SULFATE 90 UG/1
2 INHALANT RESPIRATORY (INHALATION) 4 TIMES DAILY PRN
Qty: 18 G | Refills: 2 | Status: SHIPPED | OUTPATIENT
Start: 2025-03-14

## 2025-03-14 RX ORDER — FAMOTIDINE 20 MG/1
20 TABLET, FILM COATED ORAL 2 TIMES DAILY
Qty: 60 TABLET | Refills: 6 | Status: SHIPPED | OUTPATIENT
Start: 2025-03-14

## 2025-03-14 RX ORDER — PROMETHAZINE HYDROCHLORIDE 12.5 MG/1
12.5 TABLET ORAL EVERY 6 HOURS PRN
Qty: 28 TABLET | Refills: 0 | Status: SHIPPED | OUTPATIENT
Start: 2025-03-14 | End: 2025-03-21

## 2025-03-14 ASSESSMENT — PATIENT HEALTH QUESTIONNAIRE - PHQ9
SUM OF ALL RESPONSES TO PHQ QUESTIONS 1-9: 0
2. FEELING DOWN, DEPRESSED OR HOPELESS: NOT AT ALL
1. LITTLE INTEREST OR PLEASURE IN DOING THINGS: NOT AT ALL
SUM OF ALL RESPONSES TO PHQ QUESTIONS 1-9: 0

## 2025-03-14 NOTE — PROGRESS NOTES
Chief Complaint   Patient presents with    Routine Prenatal Visit    Ultrasound        This 20 y.o.  at 20w5d with Estimated Date of Delivery: 25 presents for routine prenatal visit. Patient has no complaints today. Pt reports good FM, no LOF, VB, ctx. Pt denies H/A, vision changes, abdom pain, N/V.    Vitals:    25 1115   BP: 106/64   BP Site: Left Upper Arm   Patient Position: Sitting   Weight: 54.4 kg (120 lb)   Height: 1.676 m (5' 6\")        Patient Active Problem List    Diagnosis Date Noted    Asthma during pregnancy 2025     Overview Note:     Rare albuterol usage       Assessment & Plan Note:     noted      Primigravida in second trimester 01/10/2025     Overview Note:     EDC by LMP confirmed by  week US    1/10/25:  NIPT low risk, FEMALE, CF, SMA, DMD, Fragile X all neg  25:  AFP neg       Assessment & Plan Note:     Educated patient of signs and symptoms of  labor including but not limited to regular uterine contractions every 5-7 minutes for 1 hour, vaginal bleeding or leakage of fluid to seek immediate care.       HSV (herpes simplex virus) infection 10/02/2024     Overview Note:     HSV 1 - no known outbreaks       Assessment & Plan Note:     noted        Problem List Items Addressed This Visit       Primigravida in second trimester    Educated patient of signs and symptoms of  labor including but not limited to regular uterine contractions every 5-7 minutes for 1 hour, vaginal bleeding or leakage of fluid to seek immediate care.          HSV (herpes simplex virus) infection    noted         Asthma during pregnancy - Primary    noted         Relevant Medications    albuterol sulfate HFA (VENTOLIN HFA) 108 (90 Base) MCG/ACT inhaler        Karthik Brand MD   11:31 AM  25

## 2025-03-14 NOTE — PROGRESS NOTES
Patient comes in today for routine prenatal visit. No complaints/concerns today.     Patient needs albuterol inhaler and phenergan refill.     Fetal Movement: Yes  Contractions: No  Vaginal Bleeding: No  Leaking Fluid: No  GI/: No    Vitals:    03/14/25 1115   BP: 106/64   BP Site: Left Upper Arm   Patient Position: Sitting   Weight: 54.4 kg (120 lb)   Height: 1.676 m (5' 6\")

## 2025-04-18 ENCOUNTER — ROUTINE PRENATAL (OUTPATIENT)
Dept: OBGYN CLINIC | Age: 20
End: 2025-04-18

## 2025-04-18 VITALS
WEIGHT: 123 LBS | BODY MASS INDEX: 19.77 KG/M2 | HEIGHT: 66 IN | SYSTOLIC BLOOD PRESSURE: 98 MMHG | DIASTOLIC BLOOD PRESSURE: 56 MMHG

## 2025-04-18 DIAGNOSIS — O99.519 ASTHMA DURING PREGNANCY: ICD-10-CM

## 2025-04-18 DIAGNOSIS — B00.9 HSV (HERPES SIMPLEX VIRUS) INFECTION: ICD-10-CM

## 2025-04-18 DIAGNOSIS — Z34.02 PRIMIGRAVIDA IN SECOND TRIMESTER: Primary | ICD-10-CM

## 2025-04-18 DIAGNOSIS — J45.909 ASTHMA DURING PREGNANCY: ICD-10-CM

## 2025-04-18 NOTE — PROGRESS NOTES
Chief Complaint   Patient presents with    Routine Prenatal Visit        This 20 y.o.  at 25w5d with Estimated Date of Delivery: 25 presents for routine prenatal visit. Patient has no complaints today. Pt reports good FM, no LOF, VB, ctx. Pt denies H/A, vision changes, abdom pain, N/V.    Vitals:    25 1110   BP: (!) 98/56   BP Site: Left Upper Arm   Patient Position: Sitting   Weight: 55.8 kg (123 lb)   Height: 1.676 m (5' 6\")        Patient Active Problem List    Diagnosis Date Noted    Asthma during pregnancy 2025     Overview Note:     Rare albuterol usage       Assessment & Plan Note:     Stable currently      Primigravida in second trimester 01/10/2025     Overview Note:     EDC by LMP confirmed by  week US    1/10/25:  NIPT low risk, FEMALE, CF, SMA, DMD, Fragile X all neg  25:  AFP neg       Assessment & Plan Note:     Educated patient of signs and symptoms of  labor including but not limited to regular uterine contractions every 5-7 minutes for 1 hour, vaginal bleeding or leakage of fluid to seek immediate care.       HSV (herpes simplex virus) infection 10/02/2024     Overview Note:     HSV 1 - no known outbreaks       Assessment & Plan Note:     No prodrome or lesions per pt        Problem List Items Addressed This Visit       Primigravida in second trimester - Primary    Educated patient of signs and symptoms of  labor including but not limited to regular uterine contractions every 5-7 minutes for 1 hour, vaginal bleeding or leakage of fluid to seek immediate care.          HSV (herpes simplex virus) infection    No prodrome or lesions per pt         Asthma during pregnancy    Stable currently             Karthik Brand MD   11:28 AM  25

## 2025-04-18 NOTE — PROGRESS NOTES
Patient comes in today for routine prenatal visit. No complaints/concerns today.     Educated and gave VIS to patient on Tdap and for her to let us know at next visit if she wants it.     Fetal Movement: Yes  Contractions: No  Vaginal Bleeding: No  Leaking Fluid: No  GI/: No    Vitals:    04/18/25 1110   BP: (!) 98/56   BP Site: Left Upper Arm   Patient Position: Sitting   Weight: 55.8 kg (123 lb)   Height: 1.676 m (5' 6\")

## 2025-05-06 PROBLEM — Z34.03 PRIMIGRAVIDA IN THIRD TRIMESTER: Status: ACTIVE | Noted: 2025-01-10

## 2025-05-07 ENCOUNTER — PROCEDURE VISIT (OUTPATIENT)
Dept: OBGYN CLINIC | Age: 20
End: 2025-05-07
Payer: COMMERCIAL

## 2025-05-07 ENCOUNTER — ROUTINE PRENATAL (OUTPATIENT)
Dept: OBGYN CLINIC | Age: 20
End: 2025-05-07
Payer: COMMERCIAL

## 2025-05-07 VITALS
BODY MASS INDEX: 20.41 KG/M2 | WEIGHT: 127 LBS | HEIGHT: 66 IN | DIASTOLIC BLOOD PRESSURE: 60 MMHG | SYSTOLIC BLOOD PRESSURE: 98 MMHG

## 2025-05-07 DIAGNOSIS — O26.842 SIGNIFICANT DISCREPANCY BETWEEN UTERINE SIZE AND CLINICAL DATES, ANTEPARTUM, SECOND TRIMESTER: Primary | ICD-10-CM

## 2025-05-07 DIAGNOSIS — Z34.03 PRIMIGRAVIDA IN THIRD TRIMESTER: ICD-10-CM

## 2025-05-07 DIAGNOSIS — Z34.03 PRIMIGRAVIDA IN THIRD TRIMESTER: Primary | ICD-10-CM

## 2025-05-07 DIAGNOSIS — J45.909 ASTHMA DURING PREGNANCY: ICD-10-CM

## 2025-05-07 DIAGNOSIS — O99.519 ASTHMA DURING PREGNANCY: ICD-10-CM

## 2025-05-07 DIAGNOSIS — O36.5930 POOR FETAL GROWTH AFFECTING MANAGEMENT OF MOTHER IN THIRD TRIMESTER, SINGLE OR UNSPECIFIED FETUS: ICD-10-CM

## 2025-05-07 LAB
ERYTHROCYTE [DISTWIDTH] IN BLOOD BY AUTOMATED COUNT: 13.5 % (ref 11.9–14.6)
GLUCOSE 1 HOUR: 83 MG/DL
HCT VFR BLD AUTO: 28 % (ref 35.8–46.3)
HGB BLD-MCNC: 9.6 G/DL (ref 11.7–15.4)
MCH RBC QN AUTO: 27.4 PG (ref 26.1–32.9)
MCHC RBC AUTO-ENTMCNC: 34.3 G/DL (ref 31.4–35)
MCV RBC AUTO: 80 FL (ref 82–102)
NRBC # BLD: 0 K/UL (ref 0–0.2)
PLATELET # BLD AUTO: 296 K/UL (ref 150–450)
PMV BLD AUTO: 11.3 FL (ref 9.4–12.3)
RBC # BLD AUTO: 3.5 M/UL (ref 4.05–5.2)
T PALLIDUM AB SER QL IA: NONREACTIVE
WBC # BLD AUTO: 12.9 K/UL (ref 4.3–11.1)

## 2025-05-07 PROCEDURE — 76819 FETAL BIOPHYS PROFIL W/O NST: CPT | Performed by: OBSTETRICS & GYNECOLOGY

## 2025-05-07 PROCEDURE — 90471 IMMUNIZATION ADMIN: CPT | Performed by: NURSE PRACTITIONER

## 2025-05-07 PROCEDURE — 90715 TDAP VACCINE 7 YRS/> IM: CPT | Performed by: NURSE PRACTITIONER

## 2025-05-07 PROCEDURE — 76820 UMBILICAL ARTERY ECHO: CPT | Performed by: OBSTETRICS & GYNECOLOGY

## 2025-05-07 PROCEDURE — 99214 OFFICE O/P EST MOD 30 MIN: CPT | Performed by: NURSE PRACTITIONER

## 2025-05-07 PROCEDURE — 76816 OB US FOLLOW-UP PER FETUS: CPT | Performed by: OBSTETRICS & GYNECOLOGY

## 2025-05-07 NOTE — PROGRESS NOTES
This is a 20 y.o.   at 28w3d for routine OB visit.    Her Estimated Due Date is 2025, by Last Menstrual Period    Denies leaking of fluid, vaginal bleeding, or regular contractions. Reports fetal movement.     Current Outpatient Medications on File Prior to Visit   Medication Sig Dispense Refill    albuterol sulfate HFA (VENTOLIN HFA) 108 (90 Base) MCG/ACT inhaler Inhale 2 puffs into the lungs 4 times daily as needed for Wheezing 18 g 2    famotidine (PEPCID) 20 MG tablet Take 1 tablet by mouth 2 times daily 60 tablet 6    Prenatal Vit-Fe Fumarate-FA (PRENATAL VITAMIN PO) Take by mouth      acetaminophen (TYLENOL) 325 MG tablet Take 2 tablets by mouth every 6 hours as needed for Pain      albuterol sulfate  (90 Base) MCG/ACT inhaler Inhale into the lungs       No current facility-administered medications on file prior to visit.       Allergies   Allergen Reactions    Azithromycin Hives and Shortness Of Breath    Cefdinir Hives, Shortness Of Breath and Rash    Pineapple Extract Hives    Amoxicillin-Pot Clavulanate Rash    Other Rash and Other (See Comments)     Pears        OB History    Para Term  AB Living   1 0 0 0 0 0   SAB IAB Ectopic Molar Multiple Live Births   0 0 0 0 0 0       # 1 - Date: None, Sex: None, Weight: None, GA: None, Type: None, Apgar1: None, Apgar5: None, Living: None, Birth Comments: None        Past Medical History:   Diagnosis Date    Asthma     Eczema     Migraine        No past surgical history on file.    Family History   Problem Relation Age of Onset    Breast Cancer Maternal Grandmother     Breast Cancer Paternal Great Grandmother     Uterine Cancer Maternal Great Grandmother     Colon Cancer Neg Hx     Ovarian Cancer Neg Hx        Social History     Socioeconomic History    Marital status: Single     Spouse name: Not on file    Number of children: Not on file    Years of education: Not on file    Highest education level: Not on file   Occupational History

## 2025-05-07 NOTE — PROGRESS NOTES
Patient comes in today for routine prenatal visit. No complaints/concerns today.     Draw glucola @ 3:07pm     Tdap VIS sheet given. Tdap given.     Fetal Movement: Yes  Contractions: yes-BH  Vaginal Bleeding: No  Leaking Fluid: No  GI/: No    Vitals:    05/07/25 1433   BP: 98/60   BP Site: Left Upper Arm   Patient Position: Sitting   Weight: 57.6 kg (127 lb)   Height: 1.676 m (5' 6\")

## 2025-05-07 NOTE — ASSESSMENT & PLAN NOTE
PTL/labor precautions, FMC, and pregnancy warning signs reviewed. Pt advised to call the office at [phone] or go straight to Labor and Delivery at [facility] with any of the following concerns vaginal bleeding, leaking of fluid, samina regularly Q 5-7 minutes for over an hour or not feeling the baby move.   Rto 2 weeks OBV   Glucola, cbc, rpr today

## 2025-05-08 DIAGNOSIS — O99.013 ANEMIA AFFECTING PREGNANCY IN THIRD TRIMESTER: Primary | ICD-10-CM

## 2025-05-08 RX ORDER — FERROUS GLUCONATE 324(37.5)
324 TABLET ORAL 2 TIMES DAILY
Qty: 60 TABLET | Refills: 6 | Status: SHIPPED | OUTPATIENT
Start: 2025-05-08

## 2025-05-08 RX ORDER — DOCUSATE SODIUM 100 MG/1
100 CAPSULE, LIQUID FILLED ORAL 2 TIMES DAILY PRN
Qty: 60 CAPSULE | Refills: 6 | Status: SHIPPED | OUTPATIENT
Start: 2025-05-08

## 2025-05-08 NOTE — PROGRESS NOTES
I have reviewed the patient's visit today including history, exam and assessment by AVELINO Aranda.  I agree with treatment/plan as above.    Karthik Brand MD  8:24 AM  05/08/25

## 2025-05-08 NOTE — TELEPHONE ENCOUNTER
Please call patient and let her know that her hemoglobin was low. She needs to start taking     FeSO4 325mg PO BID Disp: 60 RF:6  Colace 100mg PO BID Disp: 60 RF: 6 prn for constipation      Recommend taking iron on empty stomach or with foods rich in Vitamin C     Encourage foods that are high in iron (I.e. red meats, green leafy vegetables, peanut butter)    Also increase fluids and foods high in fiber to prevent constipation.       Passed glucola

## 2025-05-08 NOTE — TELEPHONE ENCOUNTER
Called pt, message below reviewed with pt, pt voiced understanding.       Contraindication with ferrous sulfate, pt has allergy to pineapple. Discussed with Paloma and pt can do ferrous gluconate.     Rx pend

## 2025-05-23 ENCOUNTER — ROUTINE PRENATAL (OUTPATIENT)
Dept: OBGYN CLINIC | Age: 20
End: 2025-05-23

## 2025-05-23 VITALS
SYSTOLIC BLOOD PRESSURE: 96 MMHG | HEIGHT: 66 IN | DIASTOLIC BLOOD PRESSURE: 58 MMHG | BODY MASS INDEX: 20.57 KG/M2 | WEIGHT: 128 LBS

## 2025-05-23 DIAGNOSIS — O99.013 ANEMIA AFFECTING PREGNANCY IN THIRD TRIMESTER: ICD-10-CM

## 2025-05-23 DIAGNOSIS — O99.519 ASTHMA DURING PREGNANCY: ICD-10-CM

## 2025-05-23 DIAGNOSIS — Z34.03 PRIMIGRAVIDA IN THIRD TRIMESTER: ICD-10-CM

## 2025-05-23 DIAGNOSIS — R32 URINARY INCONTINENCE, UNSPECIFIED TYPE: Primary | ICD-10-CM

## 2025-05-23 DIAGNOSIS — J45.909 ASTHMA DURING PREGNANCY: ICD-10-CM

## 2025-05-23 DIAGNOSIS — O36.5930 POOR FETAL GROWTH AFFECTING MANAGEMENT OF MOTHER IN THIRD TRIMESTER, SINGLE OR UNSPECIFIED FETUS: ICD-10-CM

## 2025-05-23 PROBLEM — B00.9 HSV (HERPES SIMPLEX VIRUS) INFECTION: Status: RESOLVED | Noted: 2024-10-02 | Resolved: 2025-05-23

## 2025-05-23 NOTE — PROGRESS NOTES
Patient comes in today for routine prenatal visit.     Patient states she had n/v when she took the iron pill on 5/11/2025.  Patient also states when she took iron pill on 5/11/2025 that she felt one time occurrence of decreased baby movement. Patient states no issues since stopping iron pill and that baby has been moving and kicking regularly.     Patient declines pelvic exam today.     Fetal Movement: Yes  Contractions: yes-  Vaginal Bleeding: No  Leaking Fluid: yes- patient states she noticed one time occurrence of fluid leaking in the shower on 5/19/2025. Patient denies any leakage since.  GI/: No    Vitals:    05/23/25 1004   BP: (!) 96/58   BP Site: Left Upper Arm   Patient Position: Sitting   Weight: 58.1 kg (128 lb)   Height: 1.676 m (5' 6\")

## 2025-05-23 NOTE — ASSESSMENT & PLAN NOTE
Pt stopped PO iron due to DFM - reassured pt  D/W pt importance of additional Fe, risks of signif anemia

## 2025-05-23 NOTE — PROGRESS NOTES
Chief Complaint   Patient presents with    Routine Prenatal Visit        This 20 y.o.  at 30w5d with Estimated Date of Delivery: 25 presents for routine prenatal visit. Patient has some complaints today (see RN note). Pt reports good FM, no LOF, VB, ctx. Pt denies H/A, vision changes, abdom pain, N/V.  Bedside US - (+) FCA, FM, subj nml KLARISSA    Vitals:    25 1004   BP: (!) 96/58   BP Site: Left Upper Arm   Patient Position: Sitting   Weight: 58.1 kg (128 lb)   Height: 1.676 m (5' 6\")        Patient Active Problem List    Diagnosis Date Noted    Anemia affecting pregnancy in third trimester 2025     Overview Note:     Additional Fe       Assessment & Plan Note:     Pt stopped PO iron due to DFM - reassured pt  D/W pt importance of additional Fe, risks of signif anemia       Poor fetal growth affecting management of mother in third trimester 2025     Overview Note:     25: EFW 6.3%, AC 4.4%, KLARISSA nl, dopplers nl, BPP 8/8, vertex.   N/V resolved approx 20 weeks. Reports good diet/appetite. Denies nicotine use. BP normal. Protein handout reviewed. Has pediasure shakes at home. Advised to apply for WIC as they may cover shakes. D/w Dr Victoria. Plan repeat growth 3 weeks at M. Strict FKCs reviewed       Assessment & Plan Note:     Encouraged pt to increase protein intake significantly for the remainder of pregnancy -- meat, protein shakes, etc and to decrease additional exertional activity.  D/W her that we will monitor baby with  testing and probable induction at an earlier gestation.       Asthma during pregnancy 2025     Overview Note:     Rare albuterol usage       Assessment & Plan Note:     Rare symptoms      Primigravida in third trimester 01/10/2025     Overview Note:     EDC by LMP confirmed by  week US    1/10/25:  NIPT low risk, FEMALE, CF, SMA, DMD, Fragile X all neg  25:  AFP neg  25: tdap today         Assessment & Plan Note:     Educated patient  I would not recommend alternative med- please check with other pharmacies for availability- effexor really is in a class of it's own

## 2025-05-23 NOTE — ASSESSMENT & PLAN NOTE
Encouraged pt to increase protein intake significantly for the remainder of pregnancy -- meat, protein shakes, etc and to decrease additional exertional activity.  D/W her that we will monitor baby with  testing and probable induction at an earlier gestation.

## 2025-05-27 ENCOUNTER — ROUTINE PRENATAL (OUTPATIENT)
Dept: OBGYN CLINIC | Age: 20
End: 2025-05-27
Payer: COMMERCIAL

## 2025-05-27 VITALS — DIASTOLIC BLOOD PRESSURE: 63 MMHG | SYSTOLIC BLOOD PRESSURE: 112 MMHG | HEART RATE: 101 BPM

## 2025-05-27 DIAGNOSIS — O36.5930 POOR FETAL GROWTH AFFECTING MANAGEMENT OF MOTHER IN THIRD TRIMESTER, SINGLE OR UNSPECIFIED FETUS: Primary | ICD-10-CM

## 2025-05-27 DIAGNOSIS — O99.013 ANEMIA AFFECTING PREGNANCY IN THIRD TRIMESTER: ICD-10-CM

## 2025-05-27 DIAGNOSIS — Z3A.31 31 WEEKS GESTATION OF PREGNANCY: ICD-10-CM

## 2025-05-27 DIAGNOSIS — J45.909 ASTHMA DURING PREGNANCY: ICD-10-CM

## 2025-05-27 DIAGNOSIS — O09.93 HIGH-RISK PREGNANCY IN THIRD TRIMESTER: ICD-10-CM

## 2025-05-27 DIAGNOSIS — O99.519 ASTHMA DURING PREGNANCY: ICD-10-CM

## 2025-05-27 PROCEDURE — 99204 OFFICE O/P NEW MOD 45 MIN: CPT | Performed by: OBSTETRICS & GYNECOLOGY

## 2025-05-27 PROCEDURE — 76811 OB US DETAILED SNGL FETUS: CPT | Performed by: OBSTETRICS & GYNECOLOGY

## 2025-05-27 PROCEDURE — 93325 DOPPLER ECHO COLOR FLOW MAPG: CPT | Performed by: OBSTETRICS & GYNECOLOGY

## 2025-05-27 PROCEDURE — 76825 ECHO EXAM OF FETAL HEART: CPT | Performed by: OBSTETRICS & GYNECOLOGY

## 2025-05-27 PROCEDURE — 76819 FETAL BIOPHYS PROFIL W/O NST: CPT | Performed by: OBSTETRICS & GYNECOLOGY

## 2025-05-27 PROCEDURE — 76827 ECHO EXAM OF FETAL HEART: CPT | Performed by: OBSTETRICS & GYNECOLOGY

## 2025-05-27 RX ORDER — PROMETHAZINE HYDROCHLORIDE 12.5 MG/1
12.5 TABLET ORAL EVERY 6 HOURS PRN
COMMUNITY

## 2025-05-27 RX ORDER — PROMETHAZINE HYDROCHLORIDE 25 MG/1
12.5 TABLET ORAL EVERY 6 HOURS PRN
Status: SHIPPED | OUTPATIENT
Start: 2025-05-27

## 2025-05-27 ASSESSMENT — PATIENT HEALTH QUESTIONNAIRE - PHQ9
SUM OF ALL RESPONSES TO PHQ QUESTIONS 1-9: 2
SUM OF ALL RESPONSES TO PHQ QUESTIONS 1-9: 2
2. FEELING DOWN, DEPRESSED OR HOPELESS: NOT AT ALL
1. LITTLE INTEREST OR PLEASURE IN DOING THINGS: MORE THAN HALF THE DAYS
SUM OF ALL RESPONSES TO PHQ QUESTIONS 1-9: 2
SUM OF ALL RESPONSES TO PHQ QUESTIONS 1-9: 2

## 2025-05-27 NOTE — PROGRESS NOTES
thought to result from the capacity of the fetus to adapt to a hostile environment by redistributing blood flow in favor of vital organs (eg, brain, heart, placenta) at the expense of nonvital fetal organs (eg, abdominal viscera, lungs, skin, kidneys). These fetuses are at an increased risk for  hypoglycemia as hepatic glycogen stores are compromised. Greatest concern of pathologic issues when AC is <3%. Asymmetric growth restriction may progress to symmetric growth restriction.     Fetal growth restriction increases risk of IUFD, as well as,  mortality and morbidity. In addition, significant intrauterine growth restriction predisposes to childhood cognitive delay and metabolic disorders of adulthood.     As it is difficult to distinguish pathologic IUGR from constitutional SGA prenatally, recommend surveillance of fetal well-being. A normal growth trajectory, normal Doppler velocimetry of the umbilical artery, and normal amniotic fluid volume suggest a constitutionally small fetus or minimal fetal impact from uteroplacental insufficiency. While abnormal placental function with abnormal umbilical artery Doppler studies is associated with increased risk for more complicated prenatal and  status.      With normal appearing anatomy and amniotic fluid volume, less likely to be genetically abnormal or related to infection.         As AC 5-10%, EFW >10%,   Reestablish energy in/out balance- increase protein intake and decrease energy output. (Protein handout given)  Repeat US for fetal growth in 2-3 weeks.  If AC <10%, weekly testing beginning at 34wk and delivery in 39th week as long as no additional risk factors are noted.       Delivery timing is contingent upon the growth trajectory and  testing results.  General recommendations are summarized below:    - Non-severe FGR with normal UA Doppler: 38-39 weeks    - Severe FGR (EFW <3%): 37 weeks    - UA decreased end-diastolic flow: 37

## 2025-05-27 NOTE — ASSESSMENT & PLAN NOTE
Fetal Growth Restriction  Fetuses with this growth pattern may be growth restricted or constitutionally small.  As many as 70 percent of fetuses who are estimated to weigh below the 10th percentile for gestational age are small simply due to constitutional factors such as female sex or maternal ethnicity, parity, or body mass index; they are not at high risk of  mortality and morbidity. There is a real possibility of misclassifying these normally nourished, healthy, but constitutionally small, fetuses as growth restricted. By comparison, a malnourished fetus whose estimated weight is greater than the 10th percentile may be misclassified as appropriately grown and at low risk of adverse  outcome, even though its weight may be far below its genetic potential. In addition, dating discrepancies may exacerbate this issue. Interval growth rate and remaining on growth curve are more accurate methods of evaluating fetal growth for pathology.     The etiology of fetal growth restriction can be broadly categorized into maternal, fetal, and placental. Although the primary pathophysiologic mechanisms underlying these conditions are different, they often (but not always) have the same final common pathway: suboptimal uterine-placental perfusion and fetal nutrition. Suboptimal perfusion of placenta may be etiology in 30-40% of these cases, while in about 20% there are genetic disorder or congenital malformations as cause.   Many of the underlying conditions are not modifiable with the exception of tobacco use which increases risk by 3.5x. Use of other substances (alcohol, cocaine, narcotics) will also increase risk of suboptimal growth. Also modifiable are factors associated with energy imbalance due to poor maternal nutritional intake or excessive physical activity.     Symmetric growth restriction comprises 20 to 30 percent of IUGR pregnancies and refers to a growth pattern in which all fetal organs are

## 2025-05-27 NOTE — ASSESSMENT & PLAN NOTE
Asthma in pregnancy can be potentially life-threatening to mother and fetus.  Often, asthma will worsen in early third trimester. Recommend close monitoring and preventative therapy and aggressive treatment of exacerbations.      Patients with mild or well-controlled asthma will use an inhaled Beta-agonist (Albuterol type inhaler) prn or every 4-6 hours to control asthma. If patient requires use of rescue inhaler more than 2 times per week, worsening pulmonary function should be treated with the addition of an inhaled corticosteroid (ex. Flovent) BID.      For severe asthma or significant worsening of condition on the first 2 combined medications, patient should be assessed by her primary care physician. However, oral Prednisone 60 mgs daily for 1 week followed by a 10 day taper may be used.      Recommendation to decrease asthma triggers      testing twice weekly is recommended for severe asthma beginning at 32 weeks with growth assessment each month.     Avoid hemabate for treatment of uterine atony/pp hemorrhage.

## 2025-05-27 NOTE — ASSESSMENT & PLAN NOTE
Follow up [facility] 3 weeks for growth.    Genetic counseling was performed by physician after reviewing patient's genetic history.    The patient's Down syndrome age associated risk, as well as, risks of additional aneuploidy and genetic syndromes, are reduced by approximately 50% with normal anatomy ultrasounds in 1st/2nd trimester. Ultrasound alone does not rule out all abnormalities of genetics and development.     Maternal serum screening for aneuploidy was discussed with the patient including first trimester VINH-A/hCG, second trimester Quad screen (either in isolation or sequential with VINH-A) as well as non-invasive prenatal testing (NIPT) for aneuploidy from a maternal blood sample.  Positive predictive and negative predictive values for these tests were explained, questions answered. Patient understands that these are screening tests that only assesses risk for select abnormalities (trisomies 13, 18, and 21, and sex chromosome abnormalities (NIPT), as well as markers for placental health (VINH-A) and risk for open neural tube defects (quad)).  NIPT is designed for high risk populations, but should be considered by all patients who desire the current best option for screening for applicable genetic abnormalities.     Limitations of technology discussed based on maternal age, technical aspects of tests, and maternal BMI reviewed.  All questions answered and concerns discussed.     Patient elected to proceed with NIPT previously at OB office- results low risk.

## 2025-06-11 ENCOUNTER — ROUTINE PRENATAL (OUTPATIENT)
Dept: OBGYN CLINIC | Age: 20
End: 2025-06-11
Payer: COMMERCIAL

## 2025-06-11 ENCOUNTER — PROCEDURE VISIT (OUTPATIENT)
Dept: OBGYN CLINIC | Age: 20
End: 2025-06-11
Payer: COMMERCIAL

## 2025-06-11 VITALS
WEIGHT: 134 LBS | BODY MASS INDEX: 21.53 KG/M2 | SYSTOLIC BLOOD PRESSURE: 114 MMHG | DIASTOLIC BLOOD PRESSURE: 60 MMHG | HEIGHT: 66 IN

## 2025-06-11 DIAGNOSIS — O99.013 ANEMIA AFFECTING PREGNANCY IN THIRD TRIMESTER: ICD-10-CM

## 2025-06-11 DIAGNOSIS — O09.93 HIGH-RISK PREGNANCY IN THIRD TRIMESTER: ICD-10-CM

## 2025-06-11 DIAGNOSIS — O09.93 HIGH-RISK PREGNANCY IN THIRD TRIMESTER: Primary | ICD-10-CM

## 2025-06-11 DIAGNOSIS — Z34.03 PRIMIGRAVIDA IN THIRD TRIMESTER: ICD-10-CM

## 2025-06-11 DIAGNOSIS — O36.5930 POOR FETAL GROWTH AFFECTING MANAGEMENT OF MOTHER IN THIRD TRIMESTER, SINGLE OR UNSPECIFIED FETUS: Primary | ICD-10-CM

## 2025-06-11 LAB
ALBUMIN SERPL-MCNC: 2.8 G/DL (ref 3.5–5)
ALBUMIN/GLOB SERPL: 0.9 (ref 1–1.9)
ALP SERPL-CCNC: 112 U/L (ref 35–104)
ALT SERPL-CCNC: 12 U/L (ref 8–45)
ANION GAP SERPL CALC-SCNC: 11 MMOL/L (ref 7–16)
AST SERPL-CCNC: 21 U/L (ref 15–37)
BILIRUB SERPL-MCNC: 0.6 MG/DL (ref 0–1.2)
BUN SERPL-MCNC: 4 MG/DL (ref 6–23)
CALCIUM SERPL-MCNC: 8.4 MG/DL (ref 8.8–10.2)
CHLORIDE SERPL-SCNC: 103 MMOL/L (ref 98–107)
CO2 SERPL-SCNC: 20 MMOL/L (ref 20–29)
CREAT SERPL-MCNC: 0.61 MG/DL (ref 0.6–1.1)
ERYTHROCYTE [DISTWIDTH] IN BLOOD BY AUTOMATED COUNT: 14 % (ref 11.9–14.6)
FERRITIN SERPL-MCNC: 14 NG/ML (ref 8–388)
GLOBULIN SER CALC-MCNC: 3.2 G/DL (ref 2.3–3.5)
GLUCOSE SERPL-MCNC: 83 MG/DL (ref 70–99)
HCT VFR BLD AUTO: 25.5 % (ref 35.8–46.3)
HGB BLD-MCNC: 8.4 G/DL (ref 11.7–15.4)
HGB RETIC QN AUTO: 26 PG (ref 29–35)
IMM RETICS NFR: 15.6 % (ref 3–15.9)
IRON SATN MFR SERPL: 6 % (ref 20–50)
IRON SERPL-MCNC: 27 UG/DL (ref 35–100)
MCH RBC QN AUTO: 25.6 PG (ref 26.1–32.9)
MCHC RBC AUTO-ENTMCNC: 32.9 G/DL (ref 31.4–35)
MCV RBC AUTO: 77.7 FL (ref 82–102)
NRBC # BLD: 0 K/UL (ref 0–0.2)
PLATELET # BLD AUTO: 279 K/UL (ref 150–450)
PMV BLD AUTO: 10.8 FL (ref 9.4–12.3)
POTASSIUM SERPL-SCNC: 3.7 MMOL/L (ref 3.5–5.1)
PROT SERPL-MCNC: 6 G/DL (ref 6.3–8.2)
RBC # BLD AUTO: 3.28 M/UL (ref 4.05–5.2)
RETICS # AUTO: 0.04 M/UL (ref 0.03–0.1)
RETICS/RBC NFR AUTO: 1.2 % (ref 0.3–2)
SODIUM SERPL-SCNC: 134 MMOL/L (ref 136–145)
TIBC SERPL-MCNC: 423 UG/DL (ref 240–450)
UIBC SERPL-MCNC: 396 UG/DL (ref 112–347)
WBC # BLD AUTO: 12.4 K/UL (ref 4.3–11.1)

## 2025-06-11 PROCEDURE — 99213 OFFICE O/P EST LOW 20 MIN: CPT | Performed by: NURSE PRACTITIONER

## 2025-06-11 PROCEDURE — 76820 UMBILICAL ARTERY ECHO: CPT | Performed by: OBSTETRICS & GYNECOLOGY

## 2025-06-11 PROCEDURE — 76819 FETAL BIOPHYS PROFIL W/O NST: CPT | Performed by: OBSTETRICS & GYNECOLOGY

## 2025-06-11 NOTE — PATIENT INSTRUCTIONS
Thank you for coming  Return to the office in 2 week  Please call if you have any abdominal pain, & 5 contractions in 1 hour, vaginal bleeding or spotting, or leaking of fluid.  You should feel the baby move 10 times in an hour. Monitor this once a day. if you do not feel the baby move this often please call  Please call immediately if you have a headache that won't go away with tylenol, changes to your vision like funny lights or blurriness, nausea or vomiting, upper abdominal pain, or if the baby does not move at least 10 times in 2 hours.

## 2025-06-11 NOTE — PROGRESS NOTES
Patient comes in today for routine prenatal visit. No complaints/concerns today.     Fetal Movement: Yes  Contractions: Yes, BH  Vaginal Bleeding: No  Leaking Fluid: No  GI/: No    Vitals:    06/11/25 1403   BP: 114/60   BP Site: Left Upper Arm   Patient Position: Sitting   Weight: 60.8 kg (134 lb)   Height: 1.676 m (5' 6\")

## 2025-06-11 NOTE — ASSESSMENT & PLAN NOTE
PTL/labor precautions, FMC, and pregnancy warning signs reviewed. Pt advised to call the office at [phone] or go straight to Labor and Delivery at [facility] with any of the following concerns vaginal bleeding, leaking of fluid, samina regularly Q 5-7 minutes for over an hour or not feeling the baby move.   Rto 2 weeks OBV/US (seeing MFM 6/17/25)

## 2025-06-11 NOTE — PROGRESS NOTES
This is a 20 y.o.   at 33w3d for routine OB visit.    Her Estimated Due Date is 2025, by Last Menstrual Period    Denies leaking of fluid, vaginal bleeding, or regular contractions. Reports fetal movement.   Some RUQ pain    Current Outpatient Medications on File Prior to Visit   Medication Sig Dispense Refill    promethazine (PHENERGAN) 12.5 MG tablet Take 1 tablet by mouth every 6 hours as needed for Nausea      albuterol sulfate HFA (VENTOLIN HFA) 108 (90 Base) MCG/ACT inhaler Inhale 2 puffs into the lungs 4 times daily as needed for Wheezing 18 g 2    acetaminophen (TYLENOL) 325 MG tablet Take 2 tablets by mouth every 6 hours as needed for Pain       Current Facility-Administered Medications on File Prior to Visit   Medication Dose Route Frequency Provider Last Rate Last Admin    promethazine (PHENERGAN) tablet 12.5 mg  12.5 mg Oral Q6H PRN            Allergies   Allergen Reactions    Azithromycin Hives and Shortness Of Breath    Cefdinir Hives, Shortness Of Breath and Rash    Pineapple Extract Hives    Amoxicillin-Pot Clavulanate Rash    Other/Food Rash and Other (See Comments)     Pears        OB History    Para Term  AB Living   1 0 0 0 0 0   SAB IAB Ectopic Molar Multiple Live Births   0 0 0 0 0 0       # 1 - Date: None, Sex: None, Weight: None, GA: None, Type: None, Apgar1: None, Apgar5: None, Living: None, Birth Comments: None        Past Medical History:   Diagnosis Date    Asthma     Eczema     Migraine        History reviewed. No pertinent surgical history.    Family History   Problem Relation Age of Onset    Breast Cancer Maternal Grandmother     Breast Cancer Paternal Great Grandmother     Uterine Cancer Maternal Great Grandmother     Colon Cancer Neg Hx     Ovarian Cancer Neg Hx        Social History     Socioeconomic History    Marital status: Single     Spouse name: Not on file    Number of children: Not on file    Years of education: Not on file    Highest education level:

## 2025-06-12 ENCOUNTER — TELEPHONE (OUTPATIENT)
Dept: OBGYN CLINIC | Age: 20
End: 2025-06-12

## 2025-06-12 DIAGNOSIS — O99.013 ANEMIA AFFECTING PREGNANCY IN THIRD TRIMESTER: Primary | ICD-10-CM

## 2025-06-12 RX ORDER — ALBUTEROL SULFATE 90 UG/1
4 INHALANT RESPIRATORY (INHALATION) PRN
OUTPATIENT
Start: 2025-06-12

## 2025-06-12 RX ORDER — ONDANSETRON 2 MG/ML
8 INJECTION INTRAMUSCULAR; INTRAVENOUS
OUTPATIENT
Start: 2025-06-12

## 2025-06-12 RX ORDER — EPINEPHRINE 1 MG/ML
0.3 INJECTION, SOLUTION, CONCENTRATE INTRAVENOUS PRN
OUTPATIENT
Start: 2025-06-12

## 2025-06-12 RX ORDER — SODIUM CHLORIDE 9 MG/ML
5-250 INJECTION, SOLUTION INTRAVENOUS PRN
OUTPATIENT
Start: 2025-06-12

## 2025-06-12 RX ORDER — SODIUM CHLORIDE 0.9 % (FLUSH) 0.9 %
5-40 SYRINGE (ML) INJECTION PRN
OUTPATIENT
Start: 2025-06-12

## 2025-06-12 RX ORDER — SODIUM CHLORIDE 9 MG/ML
INJECTION, SOLUTION INTRAVENOUS CONTINUOUS
OUTPATIENT
Start: 2025-06-12

## 2025-06-12 RX ORDER — ACETAMINOPHEN 325 MG/1
650 TABLET ORAL
OUTPATIENT
Start: 2025-06-12

## 2025-06-12 RX ORDER — HEPARIN SODIUM (PORCINE) LOCK FLUSH IV SOLN 100 UNIT/ML 100 UNIT/ML
500 SOLUTION INTRAVENOUS PRN
OUTPATIENT
Start: 2025-06-12

## 2025-06-12 RX ORDER — FAMOTIDINE 10 MG/ML
20 INJECTION, SOLUTION INTRAVENOUS
OUTPATIENT
Start: 2025-06-12

## 2025-06-12 RX ORDER — HYDROCORTISONE SODIUM SUCCINATE 100 MG/2ML
100 INJECTION INTRAMUSCULAR; INTRAVENOUS
OUTPATIENT
Start: 2025-06-12

## 2025-06-12 RX ORDER — DIPHENHYDRAMINE HYDROCHLORIDE 50 MG/ML
50 INJECTION, SOLUTION INTRAMUSCULAR; INTRAVENOUS
OUTPATIENT
Start: 2025-06-12

## 2025-06-12 NOTE — TELEPHONE ENCOUNTER
Called patient, message below reviewed with patient. Patient voiced understanding.     [employer] message sent with iron infusion number and dosages.

## 2025-06-12 NOTE — TELEPHONE ENCOUNTER
Please let pt know Hgb 8.9, iron low. Recommend iron infusions like we discussed yesterday.  Please help pt get this scheduled      2. Also calcium levels are low. Recommend she continue PNV. But also start a calcium/vit d supplement. Calcium 1200 mg daily, Vit d 800-1000 IU daily

## 2025-06-17 ENCOUNTER — ROUTINE PRENATAL (OUTPATIENT)
Dept: OBGYN CLINIC | Age: 20
End: 2025-06-17
Payer: COMMERCIAL

## 2025-06-17 VITALS — SYSTOLIC BLOOD PRESSURE: 86 MMHG | HEART RATE: 108 BPM | DIASTOLIC BLOOD PRESSURE: 51 MMHG

## 2025-06-17 DIAGNOSIS — Z3A.34 34 WEEKS GESTATION OF PREGNANCY: ICD-10-CM

## 2025-06-17 DIAGNOSIS — O36.5930 POOR FETAL GROWTH AFFECTING MANAGEMENT OF MOTHER IN THIRD TRIMESTER, SINGLE OR UNSPECIFIED FETUS: Primary | ICD-10-CM

## 2025-06-17 DIAGNOSIS — O09.93 HIGH-RISK PREGNANCY IN THIRD TRIMESTER: ICD-10-CM

## 2025-06-17 DIAGNOSIS — O99.013 ANEMIA AFFECTING PREGNANCY IN THIRD TRIMESTER: ICD-10-CM

## 2025-06-17 PROCEDURE — 99214 OFFICE O/P EST MOD 30 MIN: CPT | Performed by: OBSTETRICS & GYNECOLOGY

## 2025-06-17 ASSESSMENT — PATIENT HEALTH QUESTIONNAIRE - PHQ9
1. LITTLE INTEREST OR PLEASURE IN DOING THINGS: MORE THAN HALF THE DAYS
SUM OF ALL RESPONSES TO PHQ QUESTIONS 1-9: 2
2. FEELING DOWN, DEPRESSED OR HOPELESS: NOT AT ALL
SUM OF ALL RESPONSES TO PHQ QUESTIONS 1-9: 2

## 2025-06-17 NOTE — PROGRESS NOTES
[facility]IC[facility]    [address] [place] [address]  P- [phone]  [phone]     MFM Follow-up Visit  Mal Rowan (2005) is a 20 y.o.  at 34w2d with 2025, by Last Menstrual Period.     Presents for evaluation of the following chief complaint(s):   Chief Complaint   Patient presents with    High Risk Pregnancy     Anemia, Asthma, Poor Fetal Growth    Pregnancy Ultrasound     Growth and BPP         Patient is not working outside of home.     Patient is scheduled to see Primary OB (PERRY/Sunday) on 2025.     Her mother, Tiara, present today.     Interval history since prior appt reviewed and chart updated as indicated.    No recent HA's.  Denies Edema. Denies Pre-eclamptic symptoms. No bleeding or LOF.  Has occasional [place] contractions  . No vaginal pressure recently. Reports good fetal movement.      Mood evaluated today based on discussion with pt and PHQ screen.   Mood Reassuring today  Recommend lifestyle/behavioral modifications to enhance mood (exercise, sunshine, mood apps, nutritional modifications, etc).     Reviewed gestational age precautions and activity goals/limitations; addressed normal pregnancy complaints, reassured and offered suggestions for care  Nutritional counseling as well as specific goals based on current maternal and fetal status; options for GERD, constipation, other common complaints reviewed  Reviewed gestational age appropriate preventive care regarding communicable disease transmission and vaccines as appropriate (including flu, TDaP >28wk, RSV 32-36wk (-), as well as, COVID.)  All questions answered and concerns discussed. Additional recommendations in problem list.     Exam:     Vitals:    25 1605   BP: (!) 86/51   BP Site: Right Upper Arm   Patient Position: Sitting   Pulse: (!) 108      Applicable labs reviewed.   Appropriate examination performed as documented.    Please see formal ultrasound

## 2025-06-19 ENCOUNTER — CLINICAL SUPPORT (OUTPATIENT)
Age: 20
End: 2025-06-19

## 2025-06-19 VITALS
SYSTOLIC BLOOD PRESSURE: 97 MMHG | HEART RATE: 83 BPM | RESPIRATION RATE: 14 BRPM | DIASTOLIC BLOOD PRESSURE: 59 MMHG | OXYGEN SATURATION: 98 % | TEMPERATURE: 98.2 F

## 2025-06-19 DIAGNOSIS — O99.013 ANEMIA AFFECTING PREGNANCY IN THIRD TRIMESTER: Primary | ICD-10-CM

## 2025-06-19 DIAGNOSIS — O09.93 HIGH-RISK PREGNANCY IN THIRD TRIMESTER: ICD-10-CM

## 2025-06-19 RX ORDER — SODIUM CHLORIDE 9 MG/ML
5-250 INJECTION, SOLUTION INTRAVENOUS PRN
OUTPATIENT
Start: 2025-06-19

## 2025-06-19 RX ORDER — ACETAMINOPHEN 325 MG/1
650 TABLET ORAL
OUTPATIENT
Start: 2025-06-19

## 2025-06-19 RX ORDER — SODIUM CHLORIDE 0.9 % (FLUSH) 0.9 %
5-40 SYRINGE (ML) INJECTION PRN
OUTPATIENT
Start: 2025-06-19

## 2025-06-19 RX ORDER — ALBUTEROL SULFATE 90 UG/1
4 INHALANT RESPIRATORY (INHALATION) PRN
OUTPATIENT
Start: 2025-06-19

## 2025-06-19 RX ORDER — ONDANSETRON 2 MG/ML
8 INJECTION INTRAMUSCULAR; INTRAVENOUS
OUTPATIENT
Start: 2025-06-19

## 2025-06-19 RX ORDER — HEPARIN 100 UNIT/ML
500 SYRINGE INTRAVENOUS PRN
OUTPATIENT
Start: 2025-06-19

## 2025-06-19 RX ORDER — HYDROCORTISONE SODIUM SUCCINATE 100 MG/2ML
100 INJECTION INTRAMUSCULAR; INTRAVENOUS
OUTPATIENT
Start: 2025-06-19

## 2025-06-19 RX ORDER — DIPHENHYDRAMINE HYDROCHLORIDE 50 MG/ML
50 INJECTION, SOLUTION INTRAMUSCULAR; INTRAVENOUS
OUTPATIENT
Start: 2025-06-19

## 2025-06-19 RX ORDER — SODIUM CHLORIDE 9 MG/ML
INJECTION, SOLUTION INTRAVENOUS CONTINUOUS
OUTPATIENT
Start: 2025-06-19

## 2025-06-19 RX ORDER — EPINEPHRINE 1 MG/ML
0.3 INJECTION, SOLUTION, CONCENTRATE INTRAVENOUS PRN
OUTPATIENT
Start: 2025-06-19

## 2025-06-19 NOTE — PROGRESS NOTES
[facility]  [address], [address]  [place] [address]  Office : [phone], Fax: [phone]     Patient arrived ambulatory to the infusion suite today for an iron infusion.   Vital signs WNL. Pain 0/10. No contraindications noted.   Patient offered warm blanket and pillow for comfort. Patient up ad padmini to BR; offered drink and snacks during visit.    24g 1\" IV placed in the patients left AC x1 attempt; flushed with 10ml NS. Patient tolerated well.   Venofer 200mg in 100ml NS administered at 440ml/hr. Infusion Time: 15 minutes.   Patient tolerated the infusion well, no complications noted.   30 minute post observation required/recommended.  Post infusion vital signs WNL.      PIV flushed with 10ml NS and removed without difficulty, catheter intact; dressing applied. Patient instructed to leave the dressing on for at least 30 minutes before removal.         Patient discharged ambulatory with steady gait out of infusion suite, feeling well. Patient instructed to call the ordering provider with any post-infusion issues.     Next appointment scheduled at a date/time convenient for them prior to patient's departure today.

## 2025-06-23 ENCOUNTER — ROUTINE PRENATAL (OUTPATIENT)
Dept: OBGYN CLINIC | Age: 20
End: 2025-06-23
Payer: COMMERCIAL

## 2025-06-23 ENCOUNTER — CLINICAL SUPPORT (OUTPATIENT)
Age: 20
End: 2025-06-23

## 2025-06-23 VITALS
RESPIRATION RATE: 20 BRPM | OXYGEN SATURATION: 97 % | HEART RATE: 102 BPM | SYSTOLIC BLOOD PRESSURE: 93 MMHG | DIASTOLIC BLOOD PRESSURE: 53 MMHG | TEMPERATURE: 98.1 F

## 2025-06-23 VITALS — DIASTOLIC BLOOD PRESSURE: 61 MMHG | SYSTOLIC BLOOD PRESSURE: 104 MMHG

## 2025-06-23 DIAGNOSIS — O09.93 HIGH-RISK PREGNANCY IN THIRD TRIMESTER: ICD-10-CM

## 2025-06-23 DIAGNOSIS — O99.013 ANEMIA AFFECTING PREGNANCY IN THIRD TRIMESTER: Primary | ICD-10-CM

## 2025-06-23 DIAGNOSIS — Z3A.35 35 WEEKS GESTATION OF PREGNANCY: ICD-10-CM

## 2025-06-23 DIAGNOSIS — O36.5930 POOR FETAL GROWTH AFFECTING MANAGEMENT OF MOTHER IN THIRD TRIMESTER, SINGLE OR UNSPECIFIED FETUS: Primary | ICD-10-CM

## 2025-06-23 DIAGNOSIS — J45.909 ASTHMA DURING PREGNANCY: ICD-10-CM

## 2025-06-23 DIAGNOSIS — O99.519 ASTHMA DURING PREGNANCY: ICD-10-CM

## 2025-06-23 DIAGNOSIS — O99.013 ANEMIA AFFECTING PREGNANCY IN THIRD TRIMESTER: ICD-10-CM

## 2025-06-23 PROBLEM — G43.009 MIGRAINE WITHOUT AURA: Status: RESOLVED | Noted: 2021-02-12 | Resolved: 2025-06-23

## 2025-06-23 PROBLEM — J45.20 MILD INTERMITTENT ASTHMA: Status: RESOLVED | Noted: 2021-02-12 | Resolved: 2025-06-23

## 2025-06-23 PROCEDURE — 76815 OB US LIMITED FETUS(S): CPT | Performed by: OBSTETRICS & GYNECOLOGY

## 2025-06-23 PROCEDURE — 76819 FETAL BIOPHYS PROFIL W/O NST: CPT | Performed by: OBSTETRICS & GYNECOLOGY

## 2025-06-23 PROCEDURE — 99214 OFFICE O/P EST MOD 30 MIN: CPT | Performed by: OBSTETRICS & GYNECOLOGY

## 2025-06-23 PROCEDURE — 76820 UMBILICAL ARTERY ECHO: CPT | Performed by: OBSTETRICS & GYNECOLOGY

## 2025-06-23 RX ORDER — SODIUM CHLORIDE 9 MG/ML
INJECTION, SOLUTION INTRAVENOUS CONTINUOUS
Status: DISCONTINUED | OUTPATIENT
Start: 2025-06-23 | End: 2025-06-23 | Stop reason: HOSPADM

## 2025-06-23 RX ORDER — ALBUTEROL SULFATE 90 UG/1
4 INHALANT RESPIRATORY (INHALATION) PRN
OUTPATIENT
Start: 2025-06-23

## 2025-06-23 RX ORDER — HYDROCORTISONE SODIUM SUCCINATE 100 MG/2ML
100 INJECTION INTRAMUSCULAR; INTRAVENOUS
Status: DISCONTINUED | OUTPATIENT
Start: 2025-06-23 | End: 2025-06-23 | Stop reason: HOSPADM

## 2025-06-23 RX ORDER — SODIUM CHLORIDE 9 MG/ML
5-250 INJECTION, SOLUTION INTRAVENOUS PRN
Status: DISCONTINUED | OUTPATIENT
Start: 2025-06-23 | End: 2025-06-23 | Stop reason: HOSPADM

## 2025-06-23 RX ORDER — HYDROCORTISONE SODIUM SUCCINATE 100 MG/2ML
100 INJECTION INTRAMUSCULAR; INTRAVENOUS
OUTPATIENT
Start: 2025-06-23

## 2025-06-23 RX ORDER — ONDANSETRON 2 MG/ML
8 INJECTION INTRAMUSCULAR; INTRAVENOUS
Status: DISCONTINUED | OUTPATIENT
Start: 2025-06-23 | End: 2025-06-23 | Stop reason: HOSPADM

## 2025-06-23 RX ORDER — SODIUM CHLORIDE 9 MG/ML
5-250 INJECTION, SOLUTION INTRAVENOUS PRN
OUTPATIENT
Start: 2025-06-23

## 2025-06-23 RX ORDER — SODIUM CHLORIDE 0.9 % (FLUSH) 0.9 %
5-40 SYRINGE (ML) INJECTION PRN
OUTPATIENT
Start: 2025-06-23

## 2025-06-23 RX ORDER — ACETAMINOPHEN 325 MG/1
650 TABLET ORAL
OUTPATIENT
Start: 2025-06-23

## 2025-06-23 RX ORDER — SODIUM CHLORIDE 9 MG/ML
INJECTION, SOLUTION INTRAVENOUS CONTINUOUS
OUTPATIENT
Start: 2025-06-23

## 2025-06-23 RX ORDER — ONDANSETRON 2 MG/ML
8 INJECTION INTRAMUSCULAR; INTRAVENOUS
OUTPATIENT
Start: 2025-06-23

## 2025-06-23 RX ORDER — HEPARIN 100 UNIT/ML
500 SYRINGE INTRAVENOUS PRN
Status: DISCONTINUED | OUTPATIENT
Start: 2025-06-23 | End: 2025-06-23 | Stop reason: HOSPADM

## 2025-06-23 RX ORDER — EPINEPHRINE 1 MG/ML
0.3 INJECTION, SOLUTION, CONCENTRATE INTRAVENOUS PRN
OUTPATIENT
Start: 2025-06-23

## 2025-06-23 RX ORDER — ACETAMINOPHEN 325 MG/1
650 TABLET ORAL
Status: DISCONTINUED | OUTPATIENT
Start: 2025-06-23 | End: 2025-06-23 | Stop reason: HOSPADM

## 2025-06-23 RX ORDER — SODIUM CHLORIDE 9 MG/ML
5-250 INJECTION, SOLUTION INTRAVENOUS PRN
Status: CANCELLED | OUTPATIENT
Start: 2025-06-23

## 2025-06-23 RX ORDER — SODIUM CHLORIDE 0.9 % (FLUSH) 0.9 %
5-40 SYRINGE (ML) INJECTION PRN
Status: DISCONTINUED | OUTPATIENT
Start: 2025-06-23 | End: 2025-06-23 | Stop reason: HOSPADM

## 2025-06-23 RX ORDER — ALBUTEROL SULFATE 90 UG/1
4 INHALANT RESPIRATORY (INHALATION) PRN
Status: DISCONTINUED | OUTPATIENT
Start: 2025-06-23 | End: 2025-06-23 | Stop reason: HOSPADM

## 2025-06-23 RX ORDER — EPINEPHRINE 1 MG/ML
0.3 INJECTION, SOLUTION, CONCENTRATE INTRAVENOUS PRN
Status: DISCONTINUED | OUTPATIENT
Start: 2025-06-23 | End: 2025-06-23 | Stop reason: HOSPADM

## 2025-06-23 RX ORDER — DIPHENHYDRAMINE HYDROCHLORIDE 50 MG/ML
50 INJECTION, SOLUTION INTRAMUSCULAR; INTRAVENOUS
OUTPATIENT
Start: 2025-06-23

## 2025-06-23 RX ORDER — DIPHENHYDRAMINE HYDROCHLORIDE 50 MG/ML
50 INJECTION, SOLUTION INTRAMUSCULAR; INTRAVENOUS
Status: DISCONTINUED | OUTPATIENT
Start: 2025-06-23 | End: 2025-06-23 | Stop reason: HOSPADM

## 2025-06-23 RX ORDER — HEPARIN 100 UNIT/ML
500 SYRINGE INTRAVENOUS PRN
OUTPATIENT
Start: 2025-06-23

## 2025-06-23 NOTE — PROGRESS NOTES
ASSESSMENT/PLAN:    Patient Active Problem List    Diagnosis Date Noted    High-risk pregnancy in third trimester 05/27/2025     Priority: High     Overview Note:     05/27/25 [facility]:  Reassuring fetal status. Growth today AC 5%, stable FGR; For full details review formal ultrasound report.    06/17/25 [facility]:  Reassuring fetal status. Growth today lagging AC (3%); For full details review formal ultrasound report.    06/23/25 [facility]: Reassuring fetal status. BPP 8/8, KLARISSA- WNL. For full details review formal ultrasound report.            Assessment & Plan Note:     Continue shared 2x/wk testing.      Primigravida in third trimester 01/10/2025     Priority: High     Overview Note:     EDC by LMP confirmed by 12/ 2/7 week US    1/10/25:  NIPT low risk, FEMALE, CF, SMA, DMD, Fragile X all neg  2/21/25:  AFP neg  5/7/25: tdap today        Anemia affecting pregnancy in third trimester 05/08/2025     Priority: Medium     Overview Note:     Additional Fe    6/11/25: not toleration PO iron, anemia studies today. Plan infusion if indicated (Hgb 8.4)  06/17/25 [facility]: Scheduled for Venofer Infusion 6/19/25         Assessment & Plan Note:     Next IV Fe scheduled for today, 6/23/25.      Poor fetal growth affecting management of mother in third trimester 05/07/2025     Priority: Medium     Overview Note:     5/7/25: EFW 6.3%, AC 4.4%, KLARISSA nl, dopplers nl, BPP 8/8, vertex.   05/27/25 [facility]:  AC 5%, KLARISSA nl, BPP 8/8.  N/V resolved approx 20 weeks. Reports good diet/appetite. Denies nicotine use. BP normal. Protein handout reviewed. Has pediasure shakes at home. Advised to apply for WIC as they may cover shakes. D/w Dr Victoria. Plan repeat growth 3 weeks at M. Strict FKCs reviewed  6/23/2025 [facility]: AC 3% on 6/17/23.  Twice weekly testing shared       Assessment & Plan Note:     Reassuring fetal status.     Plan delivery in 37th week.       Asthma during pregnancy 03/14/2025     Priority: Low     Overview Note:     Rare albuterol

## 2025-06-23 NOTE — PROGRESS NOTES
[facility]  [address], [address]  [place] [address]  Office : [phone], Fax: [phone]     Patient arrived ambulatory to the infusion suite today for an iron infusion.   Vital signs WNL: /63 (BP Site: Right Upper Arm, Patient Position: Sitting, BP Cuff Size: Medium Adult)   Pulse 88   Temp 98.1 °F (36.7 °C) (Temporal)   Resp 20   SpO2 98% on RA. Pain 0/10. No contraindications noted.   Patient offered warm blanket and pillow for comfort. Patient up ad padmini to BR; offered drink and snacks during visit.    22g 1\" IV placed in the left proximal forearm x 1 attempt; flushed with 10ml NS. Patient tolerated well.   Venofer 200mg in 100ml NS administered at  440  ml/hr. Infusion Time: 15 minutes.   Patient tolerated the infusion well, no complications noted;  including 30 minutes post observation.   Post infusion vital signs WNL: BP (!) 93/53   Pulse (!) 102   Temp 98.1 °F (36.7 °C) (Temporal)   Resp 20   SpO2 97% on RA.      PIV flushed with 10ml NS and removed without difficulty, catheter intact; dressing applied. Patient instructed to leave the dressing on for at least 30 minutes before removal.         Patient discharged ambulatory with steady gait out of infusion suite, feeling well. Patient instructed to call the ordering provider with any post-infusion issues.     Next appointment scheduled at a date/time convenient for them prior to patient's departure today.

## 2025-06-23 NOTE — PATIENT INSTRUCTIONS
Your Maternity Check List   Before Arriving to the Hospital     Find an OBGYN Doctor: [email or web address]  Maternity Pre-registration for Hospital: [email or web address]  Sign up for a Hospital Tour: [email or web address]  [place] for Prenatal Classes: [email or web address]   Car seat Safety Check: [email or web address]://[email or web address]-kids-[email or web address]   Learn More: [email or web address]   Download the [employer] Pregnancy Robe: (Scan QR Code below):  See educational videos, track your pregnancy, and Mom/Baby Care videos             Resources for Depression/Anxiety  Postpartum Support International (PSI).    PSI Warmline:  3-381-854-4PPD (4120).  WWW.POSTPARTUM.NET    Mom's IMPACTT  [email or web address]       In order to optimize maternal, fetal, and  health, we recommend the following vaccinations.   Flu- yearly (https://www.highriskpregnancyinfo.org/flu-facts-for-pregnancy)  Consider Covid vaccination/booster (https://www.highriskpregnancyinfo.org/covid-19-pregnancy)  TDaP after 28 weeks each pregnancy (https://www.highriskpregnancyinfo.org/tdap)  Consider RSV vaccine 32-36 weeks of pregnancy, if Sept- January.  (https://www.highriskpregnancyinfo.org/rsv)

## 2025-06-26 ENCOUNTER — ROUTINE PRENATAL (OUTPATIENT)
Dept: OBGYN CLINIC | Age: 20
End: 2025-06-26
Payer: COMMERCIAL

## 2025-06-26 ENCOUNTER — PROCEDURE VISIT (OUTPATIENT)
Dept: OBGYN CLINIC | Age: 20
End: 2025-06-26
Payer: COMMERCIAL

## 2025-06-26 VITALS
DIASTOLIC BLOOD PRESSURE: 60 MMHG | WEIGHT: 135 LBS | HEIGHT: 66 IN | BODY MASS INDEX: 21.69 KG/M2 | SYSTOLIC BLOOD PRESSURE: 108 MMHG

## 2025-06-26 DIAGNOSIS — O36.5930 POOR FETAL GROWTH AFFECTING MANAGEMENT OF MOTHER IN THIRD TRIMESTER, SINGLE OR UNSPECIFIED FETUS: Primary | ICD-10-CM

## 2025-06-26 DIAGNOSIS — O09.93 HIGH-RISK PREGNANCY IN THIRD TRIMESTER: ICD-10-CM

## 2025-06-26 DIAGNOSIS — N89.8 VAGINAL ODOR: ICD-10-CM

## 2025-06-26 DIAGNOSIS — J45.909 ASTHMA DURING PREGNANCY: ICD-10-CM

## 2025-06-26 DIAGNOSIS — O99.013 ANEMIA AFFECTING PREGNANCY IN THIRD TRIMESTER: ICD-10-CM

## 2025-06-26 DIAGNOSIS — O99.519 ASTHMA DURING PREGNANCY: ICD-10-CM

## 2025-06-26 DIAGNOSIS — Z34.03 PRIMIGRAVIDA IN THIRD TRIMESTER: ICD-10-CM

## 2025-06-26 DIAGNOSIS — O36.5930 POOR FETAL GROWTH AFFECTING MANAGEMENT OF MOTHER IN THIRD TRIMESTER, SINGLE OR UNSPECIFIED FETUS: ICD-10-CM

## 2025-06-26 DIAGNOSIS — Z34.03 PRIMIGRAVIDA IN THIRD TRIMESTER: Primary | ICD-10-CM

## 2025-06-26 PROCEDURE — 76820 UMBILICAL ARTERY ECHO: CPT | Performed by: OBSTETRICS & GYNECOLOGY

## 2025-06-26 PROCEDURE — 76819 FETAL BIOPHYS PROFIL W/O NST: CPT | Performed by: OBSTETRICS & GYNECOLOGY

## 2025-06-26 PROCEDURE — 99213 OFFICE O/P EST LOW 20 MIN: CPT | Performed by: NURSE PRACTITIONER

## 2025-06-26 NOTE — ASSESSMENT & PLAN NOTE
PTL/labor precautions, FMC, and pregnancy warning signs reviewed. Pt advised to call the office at [phone] or go straight to Labor and Delivery at [facility] with any of the following concerns vaginal bleeding, leaking of fluid, samina regularly Q 5-7 minutes for over an hour or not feeling the baby move.   Rto 2x/weekly OBV (shared with JOANNE)  Gbs, nuswab today

## 2025-06-26 NOTE — PROGRESS NOTES
This is a 20 y.o.   at 35w4d for routine OB visit.    Her Estimated Due Date is 2025, by Last Menstrual Period    Denies leaking of fluid, vaginal bleeding, or regular contractions. Reports fetal movement.   C/o vaginal odor. Maybe from sweating. Denies discharge/itching    Current Outpatient Medications on File Prior to Visit   Medication Sig Dispense Refill    promethazine (PHENERGAN) 12.5 MG tablet Take 1 tablet by mouth every 6 hours as needed for Nausea      albuterol sulfate HFA (VENTOLIN HFA) 108 (90 Base) MCG/ACT inhaler Inhale 2 puffs into the lungs 4 times daily as needed for Wheezing 18 g 2    acetaminophen (TYLENOL) 325 MG tablet Take 2 tablets by mouth every 6 hours as needed for Pain       No current facility-administered medications on file prior to visit.       Allergies   Allergen Reactions    Azithromycin Hives and Shortness Of Breath    Cefdinir Hives, Shortness Of Breath and Rash    Pineapple Extract Hives    Amoxicillin-Pot Clavulanate Rash    Other Rash and Other (See Comments)     Pears        OB History    Para Term  AB Living   1 0 0 0 0 0   SAB IAB Ectopic Molar Multiple Live Births   0 0 0 0 0 0       # 1 - Date: None, Sex: None, Weight: None, GA: None, Type: None, Apgar1: None, Apgar5: None, Living: None, Birth Comments: None        Past Medical History:   Diagnosis Date    Asthma     Eczema     Migraine     Migraine without aura 2021    Mild intermittent asthma 2021       History reviewed. No pertinent surgical history.    Family History   Problem Relation Age of Onset    Breast Cancer Maternal Grandmother     Breast Cancer Paternal Great Grandmother     Uterine Cancer Maternal Great Grandmother     Colon Cancer Neg Hx     Ovarian Cancer Neg Hx        Social History     Socioeconomic History    Marital status: Single     Spouse name: Not on file    Number of children: Not on file    Years of education: Not on file    Highest education level: Not on file

## 2025-06-26 NOTE — PROGRESS NOTES
Chaperone for Intimate Exam     Chaperone was offer accepted as part of the rooming process    Chaperone: Malorie Turner

## 2025-06-26 NOTE — PATIENT INSTRUCTIONS
Thank you for coming  Return to the office in 1 week  Please call if you have any abdominal pain, & 5 contractions in 1 hour, vaginal bleeding or spotting, or leaking of fluid..[email or web address]  You should feel the baby move 10 times in an hour. Monitor this once a day. if you do not feel the baby move this often please call  Please call immediately if you have a headache that won't go away with tylenol, changes to your vision like funny lights or blurriness, nausea or vomiting, upper abdominal pain, or if the baby does not move at least 10 times in 2 hours.

## 2025-06-27 ENCOUNTER — CLINICAL SUPPORT (OUTPATIENT)
Age: 20
End: 2025-06-27

## 2025-06-27 VITALS
HEART RATE: 106 BPM | RESPIRATION RATE: 20 BRPM | DIASTOLIC BLOOD PRESSURE: 64 MMHG | SYSTOLIC BLOOD PRESSURE: 106 MMHG | TEMPERATURE: 97.9 F | OXYGEN SATURATION: 96 %

## 2025-06-27 DIAGNOSIS — O99.013 ANEMIA AFFECTING PREGNANCY IN THIRD TRIMESTER: Primary | ICD-10-CM

## 2025-06-27 DIAGNOSIS — O09.93 HIGH-RISK PREGNANCY IN THIRD TRIMESTER: ICD-10-CM

## 2025-06-27 RX ORDER — HEPARIN 100 UNIT/ML
500 SYRINGE INTRAVENOUS PRN
OUTPATIENT
Start: 2025-06-27

## 2025-06-27 RX ORDER — SODIUM CHLORIDE 0.9 % (FLUSH) 0.9 %
5-40 SYRINGE (ML) INJECTION PRN
OUTPATIENT
Start: 2025-06-27

## 2025-06-27 RX ORDER — ONDANSETRON 2 MG/ML
8 INJECTION INTRAMUSCULAR; INTRAVENOUS
OUTPATIENT
Start: 2025-06-27

## 2025-06-27 RX ORDER — DIPHENHYDRAMINE HYDROCHLORIDE 50 MG/ML
50 INJECTION, SOLUTION INTRAMUSCULAR; INTRAVENOUS
OUTPATIENT
Start: 2025-06-27

## 2025-06-27 RX ORDER — SODIUM CHLORIDE 9 MG/ML
5-250 INJECTION, SOLUTION INTRAVENOUS PRN
OUTPATIENT
Start: 2025-06-27

## 2025-06-27 RX ORDER — HYDROCORTISONE SODIUM SUCCINATE 100 MG/2ML
100 INJECTION INTRAMUSCULAR; INTRAVENOUS
OUTPATIENT
Start: 2025-06-27

## 2025-06-27 RX ORDER — ACETAMINOPHEN 325 MG/1
650 TABLET ORAL
OUTPATIENT
Start: 2025-06-27

## 2025-06-27 RX ORDER — EPINEPHRINE 1 MG/ML
0.3 INJECTION, SOLUTION, CONCENTRATE INTRAVENOUS PRN
OUTPATIENT
Start: 2025-06-27

## 2025-06-27 RX ORDER — SODIUM CHLORIDE 9 MG/ML
5-250 INJECTION, SOLUTION INTRAVENOUS PRN
Status: DISCONTINUED | OUTPATIENT
Start: 2025-06-27 | End: 2025-06-27 | Stop reason: HOSPADM

## 2025-06-27 RX ORDER — ALBUTEROL SULFATE 90 UG/1
4 INHALANT RESPIRATORY (INHALATION) PRN
OUTPATIENT
Start: 2025-06-27

## 2025-06-27 RX ORDER — SODIUM CHLORIDE 9 MG/ML
INJECTION, SOLUTION INTRAVENOUS CONTINUOUS
OUTPATIENT
Start: 2025-06-27

## 2025-06-27 NOTE — PROGRESS NOTES
[facility]  [address], [address]  [place] [address]  Office : [phone], Fax: [phone]     Patient arrived ambulatory to the infusion suite today for an iron infusion.   Vital signs WNL: BP (!) 97/59 (BP Site: Right Upper Arm, Patient Position: Sitting, BP Cuff Size: Medium Adult)   Pulse 91   Temp 97.9 °F (36.6 °C) (Temporal)   Resp 20   LMP 10/20/2024   SpO2 94% on RA. Pain 0/10. No contraindications noted.   Patient offered warm blanket and pillow for comfort. Patient up ad padmini to BR; offered drink and snacks during visit.    20g 1\" IV placed in the left lateral AC x 1 attempt; flushed with 10ml NS. Patient tolerated well.   Venofer 200mg in 100ml NS administered at  440  ml/hr. Infusion Time: 16 minutes.   Patient tolerated the infusion well, no complications noted.   No observation required/recommended.   Post infusion vital signs WNL.      PIV flushed with 10ml NS and removed without difficulty, catheter intact; dressing applied. Patient instructed to leave the dressing on for at least 30 minutes before removal.         Patient discharged ambulatory with steady gait out of infusion suite, feeling well. Patient instructed to call the ordering provider with any post-infusion issues.     Next appointment scheduled at a date/time convenient for them prior to patient's departure today.

## 2025-06-29 ENCOUNTER — RESULTS FOLLOW-UP (OUTPATIENT)
Dept: OBGYN CLINIC | Age: 20
End: 2025-06-29

## 2025-06-29 LAB
A VAGINAE DNA VAG QL NAA+PROBE: NORMAL SCORE
BACTERIA SPEC CULT: NORMAL
BVAB2 DNA VAG QL NAA+PROBE: NORMAL SCORE
C ALBICANS DNA VAG QL NAA+PROBE: NEGATIVE
C GLABRATA DNA VAG QL NAA+PROBE: NEGATIVE
C TRACH RRNA SPEC QL NAA+PROBE: NEGATIVE
MEGA1 DNA VAG QL NAA+PROBE: NORMAL SCORE
N GONORRHOEA RRNA SPEC QL NAA+PROBE: NEGATIVE
SERVICE CMNT-IMP: NORMAL
SPECIMEN SOURCE: NORMAL
T VAGINALIS RRNA SPEC QL NAA+PROBE: NEGATIVE

## 2025-06-30 ENCOUNTER — CLINICAL SUPPORT (OUTPATIENT)
Age: 20
End: 2025-06-30

## 2025-06-30 ENCOUNTER — ROUTINE PRENATAL (OUTPATIENT)
Dept: OBGYN CLINIC | Age: 20
End: 2025-06-30
Payer: COMMERCIAL

## 2025-06-30 VITALS — SYSTOLIC BLOOD PRESSURE: 107 MMHG | DIASTOLIC BLOOD PRESSURE: 61 MMHG

## 2025-06-30 VITALS
SYSTOLIC BLOOD PRESSURE: 94 MMHG | DIASTOLIC BLOOD PRESSURE: 55 MMHG | TEMPERATURE: 97.7 F | HEART RATE: 97 BPM | RESPIRATION RATE: 16 BRPM | OXYGEN SATURATION: 95 %

## 2025-06-30 DIAGNOSIS — O09.93 HIGH-RISK PREGNANCY IN THIRD TRIMESTER: ICD-10-CM

## 2025-06-30 DIAGNOSIS — O99.519 ASTHMA DURING PREGNANCY: ICD-10-CM

## 2025-06-30 DIAGNOSIS — Z3A.36 36 WEEKS GESTATION OF PREGNANCY: ICD-10-CM

## 2025-06-30 DIAGNOSIS — Z34.03 PRIMIGRAVIDA IN THIRD TRIMESTER: ICD-10-CM

## 2025-06-30 DIAGNOSIS — O99.013 ANEMIA AFFECTING PREGNANCY IN THIRD TRIMESTER: Primary | ICD-10-CM

## 2025-06-30 DIAGNOSIS — O99.013 ANEMIA AFFECTING PREGNANCY IN THIRD TRIMESTER: ICD-10-CM

## 2025-06-30 DIAGNOSIS — O09.93 HIGH-RISK PREGNANCY IN THIRD TRIMESTER: Primary | ICD-10-CM

## 2025-06-30 DIAGNOSIS — J45.909 ASTHMA DURING PREGNANCY: ICD-10-CM

## 2025-06-30 DIAGNOSIS — O36.5930 POOR FETAL GROWTH AFFECTING MANAGEMENT OF MOTHER IN THIRD TRIMESTER, SINGLE OR UNSPECIFIED FETUS: ICD-10-CM

## 2025-06-30 PROCEDURE — 99214 OFFICE O/P EST MOD 30 MIN: CPT | Performed by: OBSTETRICS & GYNECOLOGY

## 2025-06-30 PROCEDURE — 76816 OB US FOLLOW-UP PER FETUS: CPT | Performed by: OBSTETRICS & GYNECOLOGY

## 2025-06-30 PROCEDURE — 76819 FETAL BIOPHYS PROFIL W/O NST: CPT | Performed by: OBSTETRICS & GYNECOLOGY

## 2025-06-30 PROCEDURE — 76820 UMBILICAL ARTERY ECHO: CPT | Performed by: OBSTETRICS & GYNECOLOGY

## 2025-06-30 RX ORDER — SODIUM CHLORIDE 9 MG/ML
5-250 INJECTION, SOLUTION INTRAVENOUS PRN
Status: CANCELLED | OUTPATIENT
Start: 2025-06-30

## 2025-06-30 RX ORDER — SODIUM CHLORIDE 0.9 % (FLUSH) 0.9 %
5-40 SYRINGE (ML) INJECTION PRN
Status: CANCELLED | OUTPATIENT
Start: 2025-06-30

## 2025-06-30 RX ORDER — SODIUM CHLORIDE 9 MG/ML
INJECTION, SOLUTION INTRAVENOUS CONTINUOUS
Status: CANCELLED | OUTPATIENT
Start: 2025-06-30

## 2025-06-30 RX ORDER — ONDANSETRON 2 MG/ML
8 INJECTION INTRAMUSCULAR; INTRAVENOUS
Status: CANCELLED | OUTPATIENT
Start: 2025-06-30

## 2025-06-30 RX ORDER — ALBUTEROL SULFATE 90 UG/1
4 INHALANT RESPIRATORY (INHALATION) PRN
Status: CANCELLED | OUTPATIENT
Start: 2025-06-30

## 2025-06-30 RX ORDER — ACETAMINOPHEN 325 MG/1
650 TABLET ORAL
Status: CANCELLED | OUTPATIENT
Start: 2025-06-30

## 2025-06-30 RX ORDER — DIPHENHYDRAMINE HYDROCHLORIDE 50 MG/ML
50 INJECTION, SOLUTION INTRAMUSCULAR; INTRAVENOUS
Status: CANCELLED | OUTPATIENT
Start: 2025-06-30

## 2025-06-30 RX ORDER — HEPARIN 100 UNIT/ML
500 SYRINGE INTRAVENOUS PRN
Status: CANCELLED | OUTPATIENT
Start: 2025-06-30

## 2025-06-30 RX ORDER — EPINEPHRINE 1 MG/ML
0.3 INJECTION, SOLUTION, CONCENTRATE INTRAVENOUS PRN
Status: CANCELLED | OUTPATIENT
Start: 2025-06-30

## 2025-06-30 RX ORDER — HYDROCORTISONE SODIUM SUCCINATE 100 MG/2ML
100 INJECTION INTRAMUSCULAR; INTRAVENOUS
Status: CANCELLED | OUTPATIENT
Start: 2025-06-30

## 2025-06-30 ASSESSMENT — PATIENT HEALTH QUESTIONNAIRE - PHQ9
SUM OF ALL RESPONSES TO PHQ QUESTIONS 1-9: 0
1. LITTLE INTEREST OR PLEASURE IN DOING THINGS: NOT AT ALL
2. FEELING DOWN, DEPRESSED OR HOPELESS: NOT AT ALL
SUM OF ALL RESPONSES TO PHQ QUESTIONS 1-9: 0

## 2025-06-30 NOTE — PATIENT INSTRUCTIONS
Your Maternity Check List   Before Arriving to the Hospital     Find an OBGYN Doctor: [email or web address]  Maternity Pre-registration for Hospital: [email or web address]  Sign up for a Hospital Tour: www.[employer]/about-us/classes-events  [place] for Prenatal Classes: www.[employer]/about-us/classes-events   Car seat Safety Check: https://www.[employer].org/coalition/safe-kids-[facility]   Learn More: www.[employer]/health-care-services/womens-health/maternity   Download the [employer] Pregnancy Robe: (Scan QR Code below):  See educational videos, track your pregnancy, and Mom/Baby Care videos          Resources for Depression/Anxiety  Postpartum Support International (PSI).    PSI Warmline:  3-488-541-4PPD (1112).  WWW.POSTPARTUM.NET    Mom's IMPACTT  [email or web address]/medical-services/womens/reproductive-[email or web address]-health/moms-impactt       In order to optimize maternal, fetal, and  health, we recommend the following vaccinations.   Flu- yearly (https://www.highriskpregnancyinfo.org/flu-facts-for-pregnancy)  Consider Covid vaccination/booster (https://www.highriskpregnancyinfo.org/covid-19-pregnancy)  TDaP after 28 weeks each pregnancy (https://www.highriskpregnancyinfo.org/tdap)  Consider RSV vaccine 32-36 weeks of pregnancy, if Sept- January.  (https://www.highriskpregnancyinfo.org/rsv)

## 2025-06-30 NOTE — PROGRESS NOTES
[facility]    [address] [place] [address]  P- [phone]  [phone]     MFM Follow-up Visit  Mal Rowan (2005) is a 20 y.o.  at 36w1d with 2025, by Last Menstrual Period.     Presents for evaluation of the following chief complaint(s):   Chief Complaint   Patient presents with    High Risk Pregnancy     Anemia, Asthma, FGR     Patient is not working outside of home. Expecting baby girl, Paulo.   Patient is scheduled to see Primary OB (PERRY/Sunday) on 7/3/25.   Patient's mother and brother present today.     Interval history since prior appt reviewed and chart updated as indicated.    No recent HA's.  Denies Edema. Denies Pre-eclamptic symptoms. No bleeding or LOF.  No contractions or cramping. No vaginal pressure recently. Reports good fetal movement.      Mood evaluated today based on discussion with pt and PHQ screen.   Mood Reassuring today  Recommend lifestyle/behavioral modifications to enhance mood (exercise, sunshine, mood apps, nutritional modifications, etc).     Reviewed gestational age precautions and activity goals/limitations; addressed normal pregnancy complaints, reassured and offered suggestions for care  Nutritional counseling as well as specific goals based on current maternal and fetal status; options for GERD, constipation, other common complaints reviewed  Reviewed gestational age appropriate preventive care regarding communicable disease transmission and vaccines as appropriate (including flu, TDaP >28wk, RSV 32-36wk (-), as well as, COVID.)  All questions answered and concerns discussed. Additional recommendations in problem list.     Exam:     Vitals:    25 1636   BP: 107/61      Applicable labs reviewed.   Appropriate examination performed as documented.    Please see formal ultrasound report under imaging tab.      ASSESSMENT/PLAN:    Patient Active Problem List    Diagnosis Date Noted    High-risk pregnancy in

## 2025-06-30 NOTE — PROGRESS NOTES
[facility]  [address], [address]  [place] [address]  Office : [phone], Fax: [phone]     Patient arrived ambulatory to the infusion suite today for an iron infusion.   Vital signs WNL. Pain 0/10. No contraindications noted.   Patient offered warm blanket and pillow for comfort. Patient up ad padmini to BR; offered drink and snacks during visit.    20g 1\" IV placed in the left proximal forearm x 1 attempt; flushed with 10ml NS. Patient tolerated well.   Venofer 200mg in 100ml NS administered at  440  ml/hr. Infusion Time: 15 minutes.   Patient tolerated the infusion well, no complications noted.   No observation required/recommended.   Post infusion vital signs WNL.      PIV flushed with 10ml NS and removed without difficulty, catheter intact; dressing applied. Patient instructed to leave the dressing on for at least 30 minutes before removal.         Patient discharged ambulatory with steady gait out of infusion suite, feeling well. Patient instructed to call the ordering provider with any post-infusion issues.     Next appointment scheduled at a date/time convenient for them prior to patient's departure today.

## 2025-06-30 NOTE — ASSESSMENT & PLAN NOTE
Patient with growth follow up due to previously noted growth lag. Growth today is stable.     As AC 5-10%, EFW >10%, patient at high risk for pathologic fetal growth restriction  Continue to ensure appropriate energy in/out balance.   Repeat growth in 2-3 weeks to assess for improvement  Weekly  testing beginning at diagnosis.

## 2025-07-02 ENCOUNTER — CLINICAL SUPPORT (OUTPATIENT)
Age: 20
End: 2025-07-02

## 2025-07-02 VITALS
SYSTOLIC BLOOD PRESSURE: 99 MMHG | DIASTOLIC BLOOD PRESSURE: 63 MMHG | HEART RATE: 92 BPM | TEMPERATURE: 98.3 F | OXYGEN SATURATION: 97 % | RESPIRATION RATE: 16 BRPM

## 2025-07-02 DIAGNOSIS — O09.93 HIGH-RISK PREGNANCY IN THIRD TRIMESTER: ICD-10-CM

## 2025-07-02 DIAGNOSIS — O99.013 ANEMIA AFFECTING PREGNANCY IN THIRD TRIMESTER: Primary | ICD-10-CM

## 2025-07-02 RX ORDER — EPINEPHRINE 1 MG/ML
0.3 INJECTION, SOLUTION, CONCENTRATE INTRAVENOUS PRN
Status: CANCELLED | OUTPATIENT
Start: 2025-07-02

## 2025-07-02 RX ORDER — SODIUM CHLORIDE 0.9 % (FLUSH) 0.9 %
5-40 SYRINGE (ML) INJECTION PRN
Status: CANCELLED | OUTPATIENT
Start: 2025-07-02

## 2025-07-02 RX ORDER — SODIUM CHLORIDE 9 MG/ML
5-250 INJECTION, SOLUTION INTRAVENOUS PRN
Status: CANCELLED | OUTPATIENT
Start: 2025-07-02

## 2025-07-02 RX ORDER — SODIUM CHLORIDE 9 MG/ML
INJECTION, SOLUTION INTRAVENOUS CONTINUOUS
Status: CANCELLED | OUTPATIENT
Start: 2025-07-02

## 2025-07-02 RX ORDER — ACETAMINOPHEN 325 MG/1
650 TABLET ORAL
Status: CANCELLED | OUTPATIENT
Start: 2025-07-02

## 2025-07-02 RX ORDER — SODIUM CHLORIDE 9 MG/ML
5-250 INJECTION, SOLUTION INTRAVENOUS PRN
Status: DISCONTINUED | OUTPATIENT
Start: 2025-07-02 | End: 2025-07-02 | Stop reason: HOSPADM

## 2025-07-02 RX ORDER — ONDANSETRON 2 MG/ML
8 INJECTION INTRAMUSCULAR; INTRAVENOUS
Status: CANCELLED | OUTPATIENT
Start: 2025-07-02

## 2025-07-02 RX ORDER — ALBUTEROL SULFATE 90 UG/1
4 INHALANT RESPIRATORY (INHALATION) PRN
Status: CANCELLED | OUTPATIENT
Start: 2025-07-02

## 2025-07-02 RX ORDER — HEPARIN 100 UNIT/ML
500 SYRINGE INTRAVENOUS PRN
Status: CANCELLED | OUTPATIENT
Start: 2025-07-02

## 2025-07-02 RX ORDER — DIPHENHYDRAMINE HYDROCHLORIDE 50 MG/ML
50 INJECTION, SOLUTION INTRAMUSCULAR; INTRAVENOUS
Status: CANCELLED | OUTPATIENT
Start: 2025-07-02

## 2025-07-02 RX ORDER — HYDROCORTISONE SODIUM SUCCINATE 100 MG/2ML
100 INJECTION INTRAMUSCULAR; INTRAVENOUS
Status: CANCELLED | OUTPATIENT
Start: 2025-07-02

## 2025-07-02 NOTE — PROGRESS NOTES
[facility]  [address], [address]  [place] [address]  Office : [phone], Fax: [phone]     Patient arrived ambulatory to the infusion suite today for an iron infusion - Treatment 5/5.   Vital signs WNL. Pain 0/10. No contraindications noted.   Patient offered warm blanket and pillow for comfort. Patient up ad padmini to BR; offered drink and snacks during visit.    22g 1\" IV placed in the left lateral AC x 1 attempt; flushed with 10ml NS. Patient tolerated well.   Venofer 200mg in 100ml NS administered at  440  ml/hr. Infusion Time: 16 minutes.   Patient tolerated the infusion well, no complications noted.   No observation required/recommended.   Post infusion vital signs WNL.      PIV flushed with 10ml NS and removed without difficulty, catheter intact; dressing applied. Patient instructed to leave the dressing on for at least 30 minutes before removal.         Patient discharged ambulatory with steady gait out of infusion suite, feeling well. Patient instructed to call the ordering provider with any post-infusion issues.     Next appointment scheduled at a date/time convenient for them prior to patient's departure today.

## 2025-07-03 ENCOUNTER — ROUTINE PRENATAL (OUTPATIENT)
Dept: OBGYN CLINIC | Age: 20
End: 2025-07-03

## 2025-07-03 ENCOUNTER — PROCEDURE VISIT (OUTPATIENT)
Dept: OBGYN CLINIC | Age: 20
End: 2025-07-03

## 2025-07-03 VITALS
HEART RATE: 97 BPM | HEIGHT: 66 IN | OXYGEN SATURATION: 97 % | SYSTOLIC BLOOD PRESSURE: 108 MMHG | BODY MASS INDEX: 21.21 KG/M2 | WEIGHT: 132 LBS | DIASTOLIC BLOOD PRESSURE: 70 MMHG

## 2025-07-03 DIAGNOSIS — O99.013 ANEMIA AFFECTING PREGNANCY IN THIRD TRIMESTER: ICD-10-CM

## 2025-07-03 DIAGNOSIS — O36.5930 POOR FETAL GROWTH AFFECTING MANAGEMENT OF MOTHER IN THIRD TRIMESTER, SINGLE OR UNSPECIFIED FETUS: Primary | ICD-10-CM

## 2025-07-03 DIAGNOSIS — Z34.03 PRIMIGRAVIDA IN THIRD TRIMESTER: ICD-10-CM

## 2025-07-03 DIAGNOSIS — O09.93 HIGH-RISK PREGNANCY IN THIRD TRIMESTER: ICD-10-CM

## 2025-07-03 NOTE — ASSESSMENT & PLAN NOTE
PTL/labor precautions, FMC, and pregnancy warning signs reviewed. Pt advised to call the office at [phone] or go straight to Labor and Delivery at [facility] with any of the following concerns vaginal bleeding, leaking of fluid, samina regularly Q 5-7 minutes for over an hour or not feeling the baby move.   Rto Monday US and visit    D/W pt at length induction vs spontaneous labor risks and benefits including but not limited to: favorable cervix, Denton's score, elective vs medical induction, increased risk of longer latent stage, increased risk of FHT's abnormalities, tachysystole, increased risk of , placental abruption, uterine rupture, varying methods of cervical ripening and induction (cytotec, cervical balloon, cervidil and pitocin)  Pt understands, accepts all known and unknown risks and wishes to proceed.

## 2025-07-03 NOTE — PROGRESS NOTES
I have reviewed the patient's visit today including history, exam and assessment by AVELINO Aranda.  I agree with treatment/plan as above.    Karthik Brand MD  4:36 PM  07/03/25

## 2025-07-03 NOTE — PROGRESS NOTES
Patient comes in today for routine prenatal visit. No complaints/concerns today.     Fetal Movement: Yes  Contractions: Yes-BH  Vaginal Bleeding: No  Leaking Fluid: No  GI/: yes-n/v, phenergan helpful.     Vitals:    07/03/25 1423 07/03/25 1432   BP: (!) 80/54 (!) 96/58   BP Site: Left Upper Arm Right Upper Arm   Patient Position: Sitting Supine   Pulse:  97   SpO2:  97%   Weight: 59.9 kg (132 lb)    Height: 1.676 m (5' 6\")

## 2025-07-03 NOTE — PROGRESS NOTES
This is a 20 y.o.   at 36w4d for routine OB visit.    Her Estimated Due Date is 2025, by Last Menstrual Period    Denies leaking of fluid, vaginal bleeding, or regular contractions. Reports fetal movement.     After ultrasound pt moved to exam room and began to feel dizzy and lightheaded. Reports she just drank a sprite from [employer] and always feels bad after getting anything from there. Symptoms resolved after laying down, provided hydration and snack. + Fhts after visit 160s. BP improved from 80/54, 96/58, to 108/70    Current Outpatient Medications on File Prior to Visit   Medication Sig Dispense Refill    promethazine (PHENERGAN) 12.5 MG tablet Take 1 tablet by mouth every 6 hours as needed for Nausea      albuterol sulfate HFA (VENTOLIN HFA) 108 (90 Base) MCG/ACT inhaler Inhale 2 puffs into the lungs 4 times daily as needed for Wheezing 18 g 2    acetaminophen (TYLENOL) 325 MG tablet Take 2 tablets by mouth every 6 hours as needed for Pain       No current facility-administered medications on file prior to visit.       Allergies   Allergen Reactions    Azithromycin Hives and Shortness Of Breath    Cefdinir Hives, Shortness Of Breath and Rash    Pineapple Extract Hives    Amoxicillin-Pot Clavulanate Rash    Other Rash and Other (See Comments)     Pears        OB History    Para Term  AB Living   1 0 0 0 0 0   SAB IAB Ectopic Molar Multiple Live Births   0 0 0 0 0 0       # 1 - Date: None, Sex: None, Weight: None, GA: None, Type: None, Apgar1: None, Apgar5: None, Living: None, Birth Comments: None        Past Medical History:   Diagnosis Date    Asthma     Eczema     Migraine     Migraine without aura 2021    Mild intermittent asthma 2021       No past surgical history on file.    Family History   Problem Relation Age of Onset    Breast Cancer Maternal Grandmother     Breast Cancer Paternal Great Grandmother     Uterine Cancer Maternal Great Grandmother     Colon Cancer Neg Hx

## 2025-07-04 ENCOUNTER — HOSPITAL ENCOUNTER (OUTPATIENT)
Age: 20
LOS: 1 days | Discharge: HOME | End: 2025-07-04
Attending: OBSTETRICS & GYNECOLOGY | Admitting: OBSTETRICS & GYNECOLOGY
Payer: COMMERCIAL

## 2025-07-04 VITALS
BODY MASS INDEX: 21.21 KG/M2 | SYSTOLIC BLOOD PRESSURE: 110 MMHG | RESPIRATION RATE: 16 BRPM | HEART RATE: 82 BPM | TEMPERATURE: 99 F | WEIGHT: 132 LBS | DIASTOLIC BLOOD PRESSURE: 62 MMHG | HEIGHT: 66 IN

## 2025-07-04 PROBLEM — O47.9 FALSE LABOR: Status: RESOLVED | Noted: 2025-07-04 | Resolved: 2025-07-04

## 2025-07-04 PROBLEM — O47.9 FALSE LABOR: Status: ACTIVE | Noted: 2025-07-04

## 2025-07-04 PROCEDURE — 59025 FETAL NON-STRESS TEST: CPT

## 2025-07-04 PROCEDURE — 99283 EMERGENCY DEPT VISIT LOW MDM: CPT

## 2025-07-04 NOTE — H&P
Obstetrics History & Physical/OB ED note    Name: Mal Rowan MRN: [id number]     YOB: 2005  Age: 20 y.o.  Sex: female      Reason for Presentation:  contractions/possible labor    HPI: Mal Rowan is a 20 y.o.  female with Estimated Date of Delivery: 25 at 36w5d gestation. Her obstetrical history is significant for fetal growth restriction with EFW 13th percentile as of 25. Induction of labor is planned for 7/10/25 (37+ weeks). Prenatal records reviewed.     Here with complaint of contractions. She has had irregular contractions for a while, but for about 7 hours now they have been more consistent/regular. Cervix in office yesterday was 3/70/-1    She reports good fetal movement. She denies vaginal bleeding. She denies leakage of fluid.      Past History:  OB History    Para Term  AB Living   1        SAB IAB Ectopic Molar Multiple Live Births              # Outcome Date GA Lbr Jayson/2nd Weight Sex Type Anes PTL Lv   1 Current              Past Medical History:   Diagnosis Date    Eczema     Migraine without aura 2021    Mild intermittent asthma 2021     History reviewed. No pertinent surgical history.  Social History     Tobacco Use    Smoking status: Never     Passive exposure: Yes    Smokeless tobacco: Never   Substance Use Topics    Alcohol use: Not Currently     Prior to Admission medications    Medication Sig Start Date End Date Taking? Authorizing Provider   promethazine (PHENERGAN) 12.5 MG tablet Take 1 tablet by mouth every 6 hours as needed for Nausea   Yes Provider, MD Ludwig   albuterol sulfate HFA (VENTOLIN HFA) 108 (90 Base) MCG/ACT inhaler Inhale 2 puffs into the lungs 4 times daily as needed for Wheezing 3/14/25   Karthik Brand MD   acetaminophen (TYLENOL) 325 MG tablet Take 2 tablets by mouth every 6 hours as needed for Pain    ProviderLudwig MD     Allergies   Allergen Reactions    Azithromycin Hives and  Shortness Of Breath    Cefdinir Hives, Shortness Of Breath and Rash    Pineapple Extract Hives    Amoxicillin-Pot Clavulanate Rash    Other Rash and Other (See Comments)     Pears        Physical Exam:  VITALS:  /62   Pulse 82   Temp 99 °F (37.2 °C) (Oral)   Resp 16   Ht 1.676 m (5' 6\")   Wt 59.9 kg (132 lb)   LMP 10/20/2024   BMI 21.31 kg/m²     CONSTITUTIONAL:  awake, alert, cooperative, no visible distress from contractions  ABDOMEN:  non-tender  FHTs: Baseline: 130 bpm; Variability: Good {> 6 bpm); Accelerations: present; Decelerations: Absent; Reactive NST   Uterine activity/Contractions on monitor: Regular on monitor at first but spaced out while she was in triage  Membranes: intact  Cervix: 3 cm dilated, 50% effaced, -2 station per RN; no change from office visit yesterday    GBS negative      Assessment:  36w5d gestation  Not in labor;   Reassuring fetal status    Plan: Discharge home, follow-up at next OB appointment

## 2025-07-06 ENCOUNTER — PREP FOR PROCEDURE (OUTPATIENT)
Dept: OBGYN CLINIC | Age: 20
End: 2025-07-06

## 2025-07-06 RX ORDER — METHYLERGONOVINE MALEATE 0.2 MG/ML
200 INJECTION INTRAVENOUS PRN
Status: CANCELLED | OUTPATIENT
Start: 2025-07-06

## 2025-07-06 RX ORDER — DEXTROSE, SODIUM CHLORIDE, SODIUM LACTATE, POTASSIUM CHLORIDE, AND CALCIUM CHLORIDE 5; .6; .31; .03; .02 G/100ML; G/100ML; G/100ML; G/100ML; G/100ML
INJECTION, SOLUTION INTRAVENOUS CONTINUOUS
Status: CANCELLED | OUTPATIENT
Start: 2025-07-06

## 2025-07-06 RX ORDER — ONDANSETRON 2 MG/ML
4 INJECTION INTRAMUSCULAR; INTRAVENOUS EVERY 6 HOURS PRN
Status: CANCELLED | OUTPATIENT
Start: 2025-07-06

## 2025-07-06 RX ORDER — SODIUM CHLORIDE, SODIUM LACTATE, POTASSIUM CHLORIDE, AND CALCIUM CHLORIDE .6; .31; .03; .02 G/100ML; G/100ML; G/100ML; G/100ML
1000 INJECTION, SOLUTION INTRAVENOUS PRN
Status: CANCELLED | OUTPATIENT
Start: 2025-07-06

## 2025-07-06 RX ORDER — SODIUM CHLORIDE 0.9 % (FLUSH) 0.9 %
5-40 SYRINGE (ML) INJECTION PRN
Status: CANCELLED | OUTPATIENT
Start: 2025-07-06

## 2025-07-06 RX ORDER — TERBUTALINE SULFATE 1 MG/ML
0.25 INJECTION SUBCUTANEOUS ONCE
Status: CANCELLED | OUTPATIENT
Start: 2025-07-06 | End: 2025-07-06

## 2025-07-06 RX ORDER — SODIUM CHLORIDE 9 MG/ML
INJECTION, SOLUTION INTRAVENOUS PRN
Status: CANCELLED | OUTPATIENT
Start: 2025-07-06

## 2025-07-06 RX ORDER — SODIUM CHLORIDE 0.9 % (FLUSH) 0.9 %
5-40 SYRINGE (ML) INJECTION EVERY 12 HOURS SCHEDULED
Status: CANCELLED | OUTPATIENT
Start: 2025-07-06

## 2025-07-06 RX ORDER — ONDANSETRON 4 MG/1
4 TABLET, ORALLY DISINTEGRATING ORAL EVERY 8 HOURS PRN
Status: CANCELLED | OUTPATIENT
Start: 2025-07-06

## 2025-07-06 RX ORDER — MISOPROSTOL 100 UG/1
400 TABLET ORAL PRN
Status: CANCELLED | OUTPATIENT
Start: 2025-07-06

## 2025-07-06 RX ORDER — SODIUM CHLORIDE, SODIUM LACTATE, POTASSIUM CHLORIDE, AND CALCIUM CHLORIDE .6; .31; .03; .02 G/100ML; G/100ML; G/100ML; G/100ML
500 INJECTION, SOLUTION INTRAVENOUS PRN
Status: CANCELLED | OUTPATIENT
Start: 2025-07-06

## 2025-07-07 ENCOUNTER — PROCEDURE VISIT (OUTPATIENT)
Dept: OBGYN CLINIC | Age: 20
End: 2025-07-07
Payer: COMMERCIAL

## 2025-07-07 ENCOUNTER — ROUTINE PRENATAL (OUTPATIENT)
Dept: OBGYN CLINIC | Age: 20
End: 2025-07-07
Payer: COMMERCIAL

## 2025-07-07 VITALS
HEIGHT: 66 IN | DIASTOLIC BLOOD PRESSURE: 62 MMHG | WEIGHT: 130 LBS | BODY MASS INDEX: 20.89 KG/M2 | SYSTOLIC BLOOD PRESSURE: 108 MMHG

## 2025-07-07 DIAGNOSIS — O36.5930 POOR FETAL GROWTH AFFECTING MANAGEMENT OF MOTHER IN THIRD TRIMESTER, SINGLE OR UNSPECIFIED FETUS: ICD-10-CM

## 2025-07-07 DIAGNOSIS — Z34.03 PRIMIGRAVIDA IN THIRD TRIMESTER: ICD-10-CM

## 2025-07-07 DIAGNOSIS — O36.5930 POOR FETAL GROWTH AFFECTING MANAGEMENT OF MOTHER IN THIRD TRIMESTER, SINGLE OR UNSPECIFIED FETUS: Primary | ICD-10-CM

## 2025-07-07 DIAGNOSIS — O09.93 HIGH-RISK PREGNANCY IN THIRD TRIMESTER: ICD-10-CM

## 2025-07-07 DIAGNOSIS — J45.909 ASTHMA DURING PREGNANCY: Primary | ICD-10-CM

## 2025-07-07 DIAGNOSIS — O99.013 ANEMIA AFFECTING PREGNANCY IN THIRD TRIMESTER: ICD-10-CM

## 2025-07-07 DIAGNOSIS — O99.519 ASTHMA DURING PREGNANCY: Primary | ICD-10-CM

## 2025-07-07 PROCEDURE — 99459 PELVIC EXAMINATION: CPT | Performed by: OBSTETRICS & GYNECOLOGY

## 2025-07-07 PROCEDURE — 76820 UMBILICAL ARTERY ECHO: CPT | Performed by: OBSTETRICS & GYNECOLOGY

## 2025-07-07 PROCEDURE — 76819 FETAL BIOPHYS PROFIL W/O NST: CPT | Performed by: OBSTETRICS & GYNECOLOGY

## 2025-07-07 PROCEDURE — 99213 OFFICE O/P EST LOW 20 MIN: CPT | Performed by: OBSTETRICS & GYNECOLOGY

## 2025-07-07 NOTE — PATIENT INSTRUCTIONS
Please call Labor and Delivery ([phone]) Thursday morning at 5:00 am and they will tell you when to be there.  I will see you later that morning!  If you develop signs and symptoms of labor including but not limited to regular uterine contractions every 5-7 minutes for 1 hour, vaginal bleeding or leakage of fluid please contact our office and/or seek immediate care.  Thanks for coming to see us today and letting us take care of you!

## 2025-07-07 NOTE — PROGRESS NOTES
Chief Complaint   Patient presents with    Routine Prenatal Visit    Ultrasound        This 20 y.o.  at 37w1d with Estimated Date of Delivery: 25 presents for routine prenatal visit. Patient has no complaints today. Pt reports good FM, no LOF, VB, ctx. Pt denies H/A, vision changes, abdom pain, N/V.    Vitals:    25 0919   BP: 108/62   BP Site: Left Upper Arm   Patient Position: Sitting   Weight: 59 kg (130 lb)   Height: 1.676 m (5' 6\")        Patient Active Problem List    Diagnosis Date Noted    High-risk pregnancy in third trimester 2025     Overview Note:     25 [facility]:  Reassuring fetal status. Growth today AC 5%, stable FGR; For full details review formal ultrasound report.    25 [facility]:  Reassuring fetal status. Growth today lagging AC (3%); For full details review formal ultrasound report.    25 [facility]: Reassuring fetal status. BPP 8/8, KLARISSA- WNL. For full details review formal ultrasound report.   25 [facility]: Reassuring fetal status. Growth today lagging AC; KLARISSA- WNL, BPP- 8/8. For full details review formal ultrasound report.   Plan delivery for 37th week.            Assessment & Plan Note:     noted      Anemia affecting pregnancy in third trimester 2025     Overview Note:     Additional Fe    25: not toleration PO iron, anemia studies today. Plan infusion if indicated (Hgb 8.4)  25 [facility]: Scheduled for Venofer Infusion 6/19/25  7/3/25: venofer infusion x5 (last completed 2025)         Assessment & Plan Note:     noted      Poor fetal growth affecting management of mother in third trimester 2025     Overview Note:     25: EFW 6.3%, AC 4.4%, KLARISSA nl, dopplers nl, BPP 8/8, vertex.   25 [facility]:  AC 5%, KLARISSA nl, BPP 8/8.  N/V resolved approx 20 weeks. Reports good diet/appetite. Denies nicotine use. BP normal. Protein handout reviewed. Has pediasure shakes at home. Advised to apply for WIC as they may cover shakes. D/w Dr Victoria. Plan repeat

## 2025-07-07 NOTE — PROGRESS NOTES
Patient comes in today for routine prenatal visit. No complaints/concerns today.     Fetal Movement: Yes  Contractions: Yes  Vaginal Bleeding: No  Leaking Fluid: No  GI/: yes-n/v, patient has mild n/v, patient denies using phenergan.     Vitals:    07/07/25 0919   BP: 108/62   BP Site: Left Upper Arm   Patient Position: Sitting   Weight: 59 kg (130 lb)   Height: 1.676 m (5' 6\")

## 2025-07-10 ENCOUNTER — ANESTHESIA (OUTPATIENT)
Dept: LABOR AND DELIVERY | Age: 20
End: 2025-07-10
Payer: COMMERCIAL

## 2025-07-10 ENCOUNTER — APPOINTMENT (OUTPATIENT)
Dept: LABOR AND DELIVERY | Age: 20
End: 2025-07-10
Payer: COMMERCIAL

## 2025-07-10 ENCOUNTER — HOSPITAL ENCOUNTER (INPATIENT)
Age: 20
LOS: 2 days | Discharge: HOME | End: 2025-07-12
Attending: OBSTETRICS & GYNECOLOGY | Admitting: OBSTETRICS & GYNECOLOGY
Payer: COMMERCIAL

## 2025-07-10 ENCOUNTER — ANESTHESIA EVENT (OUTPATIENT)
Dept: LABOR AND DELIVERY | Age: 20
End: 2025-07-10
Payer: COMMERCIAL

## 2025-07-10 LAB
ABO + RH BLD: NORMAL
BASOPHILS # BLD: 0.05 K/UL (ref 0–0.2)
BASOPHILS NFR BLD: 0.5 % (ref 0–2)
BLOOD GROUP ANTIBODIES SERPL: NORMAL
DIFFERENTIAL METHOD BLD: ABNORMAL
EOSINOPHIL # BLD: 0.12 K/UL (ref 0–0.8)
EOSINOPHIL NFR BLD: 1.2 % (ref 0.5–7.8)
ERYTHROCYTE [DISTWIDTH] IN BLOOD BY AUTOMATED COUNT: 20.5 % (ref 11.9–14.6)
HCT VFR BLD AUTO: 32.2 % (ref 35.8–46.3)
HGB BLD-MCNC: 10.6 G/DL (ref 11.7–15.4)
IMM GRANULOCYTES # BLD AUTO: 0.1 K/UL (ref 0–0.5)
IMM GRANULOCYTES NFR BLD AUTO: 1 % (ref 0–5)
LYMPHOCYTES # BLD: 1.77 K/UL (ref 0.5–4.6)
LYMPHOCYTES NFR BLD: 17.1 % (ref 13–44)
MCH RBC QN AUTO: 26.1 PG (ref 26.1–32.9)
MCHC RBC AUTO-ENTMCNC: 32.9 G/DL (ref 31.4–35)
MCV RBC AUTO: 79.3 FL (ref 82–102)
MONOCYTES # BLD: 0.78 K/UL (ref 0.1–1.3)
MONOCYTES NFR BLD: 7.5 % (ref 4–12)
NEUTS SEG # BLD: 7.55 K/UL (ref 1.7–8.2)
NEUTS SEG NFR BLD: 72.7 % (ref 43–78)
NRBC # BLD: 0 K/UL (ref 0–0.2)
PLATELET # BLD AUTO: 283 K/UL (ref 150–450)
PMV BLD AUTO: 10 FL (ref 9.4–12.3)
RBC # BLD AUTO: 4.06 M/UL (ref 4.05–5.2)
SPECIMEN EXP DATE BLD: NORMAL
T PALLIDUM AB SER QL IA: NONREACTIVE
WBC # BLD AUTO: 10.4 K/UL (ref 4.3–11.1)

## 2025-07-10 PROCEDURE — 86901 BLOOD TYPING SEROLOGIC RH(D): CPT

## 2025-07-10 PROCEDURE — 4A1HXCZ MONITORING OF PRODUCTS OF CONCEPTION, CARDIAC RATE, EXTERNAL APPROACH: ICD-10-PCS | Performed by: STUDENT IN AN ORGANIZED HEALTH CARE EDUCATION/TRAINING PROGRAM

## 2025-07-10 PROCEDURE — 51701 INSERT BLADDER CATHETER: CPT

## 2025-07-10 PROCEDURE — 2580000003 HC RX 258: Performed by: OBSTETRICS & GYNECOLOGY

## 2025-07-10 PROCEDURE — 7220000101 HC DELIVERY VAGINAL/SINGLE

## 2025-07-10 PROCEDURE — 6360000002 HC RX W HCPCS: Performed by: STUDENT IN AN ORGANIZED HEALTH CARE EDUCATION/TRAINING PROGRAM

## 2025-07-10 PROCEDURE — 6370000000 HC RX 637 (ALT 250 FOR IP): Performed by: OBSTETRICS & GYNECOLOGY

## 2025-07-10 PROCEDURE — 3700000025 EPIDURAL BLOCK: Performed by: STUDENT IN AN ORGANIZED HEALTH CARE EDUCATION/TRAINING PROGRAM

## 2025-07-10 PROCEDURE — 1100000000 HC RM PRIVATE

## 2025-07-10 PROCEDURE — 6360000002 HC RX W HCPCS: Performed by: OBSTETRICS & GYNECOLOGY

## 2025-07-10 PROCEDURE — 7100000011 HC PHASE II RECOVERY - ADDTL 15 MIN

## 2025-07-10 PROCEDURE — 3E033VJ INTRODUCTION OF OTHER HORMONE INTO PERIPHERAL VEIN, PERCUTANEOUS APPROACH: ICD-10-PCS | Performed by: OBSTETRICS & GYNECOLOGY

## 2025-07-10 PROCEDURE — 59409 OBSTETRICAL CARE: CPT | Performed by: OBSTETRICS & GYNECOLOGY

## 2025-07-10 PROCEDURE — 7210000100 HC LABOR FEE PER 1 HR

## 2025-07-10 PROCEDURE — 10907ZC DRAINAGE OF AMNIOTIC FLUID, THERAPEUTIC FROM PRODUCTS OF CONCEPTION, VIA NATURAL OR ARTIFICIAL OPENING: ICD-10-PCS | Performed by: OBSTETRICS & GYNECOLOGY

## 2025-07-10 PROCEDURE — 0KQM0ZZ REPAIR PERINEUM MUSCLE, OPEN APPROACH: ICD-10-PCS | Performed by: OBSTETRICS & GYNECOLOGY

## 2025-07-10 PROCEDURE — 86850 RBC ANTIBODY SCREEN: CPT

## 2025-07-10 PROCEDURE — 85025 COMPLETE CBC W/AUTO DIFF WBC: CPT

## 2025-07-10 PROCEDURE — 00HU33Z INSERTION OF INFUSION DEVICE INTO SPINAL CANAL, PERCUTANEOUS APPROACH: ICD-10-PCS | Performed by: STUDENT IN AN ORGANIZED HEALTH CARE EDUCATION/TRAINING PROGRAM

## 2025-07-10 PROCEDURE — 7100000010 HC PHASE II RECOVERY - FIRST 15 MIN

## 2025-07-10 PROCEDURE — 86780 TREPONEMA PALLIDUM: CPT

## 2025-07-10 PROCEDURE — 86900 BLOOD TYPING SEROLOGIC ABO: CPT

## 2025-07-10 RX ORDER — SODIUM CHLORIDE, SODIUM LACTATE, POTASSIUM CHLORIDE, AND CALCIUM CHLORIDE .6; .31; .03; .02 G/100ML; G/100ML; G/100ML; G/100ML
1000 INJECTION, SOLUTION INTRAVENOUS PRN
Status: DISCONTINUED | OUTPATIENT
Start: 2025-07-10 | End: 2025-07-10

## 2025-07-10 RX ORDER — ACETAMINOPHEN 500 MG
1000 TABLET ORAL EVERY 8 HOURS
Status: DISCONTINUED | OUTPATIENT
Start: 2025-07-10 | End: 2025-07-12 | Stop reason: HOSPADM

## 2025-07-10 RX ORDER — SODIUM CHLORIDE 0.9 % (FLUSH) 0.9 %
5-40 SYRINGE (ML) INJECTION PRN
Status: DISCONTINUED | OUTPATIENT
Start: 2025-07-10 | End: 2025-07-12 | Stop reason: HOSPADM

## 2025-07-10 RX ORDER — SODIUM CHLORIDE 0.9 % (FLUSH) 0.9 %
5-40 SYRINGE (ML) INJECTION EVERY 12 HOURS SCHEDULED
Status: DISCONTINUED | OUTPATIENT
Start: 2025-07-10 | End: 2025-07-10

## 2025-07-10 RX ORDER — DEXTROSE, SODIUM CHLORIDE, SODIUM LACTATE, POTASSIUM CHLORIDE, AND CALCIUM CHLORIDE 5; .6; .31; .03; .02 G/100ML; G/100ML; G/100ML; G/100ML; G/100ML
INJECTION, SOLUTION INTRAVENOUS CONTINUOUS
Status: DISCONTINUED | OUTPATIENT
Start: 2025-07-10 | End: 2025-07-10

## 2025-07-10 RX ORDER — ONDANSETRON 2 MG/ML
4 INJECTION INTRAMUSCULAR; INTRAVENOUS EVERY 6 HOURS PRN
Status: DISCONTINUED | OUTPATIENT
Start: 2025-07-10 | End: 2025-07-10

## 2025-07-10 RX ORDER — OXYCODONE HYDROCHLORIDE 5 MG/1
5 TABLET ORAL EVERY 4 HOURS PRN
Status: DISCONTINUED | OUTPATIENT
Start: 2025-07-10 | End: 2025-07-12 | Stop reason: HOSPADM

## 2025-07-10 RX ORDER — METHYLERGONOVINE MALEATE 0.2 MG/ML
200 INJECTION INTRAVENOUS PRN
Status: DISCONTINUED | OUTPATIENT
Start: 2025-07-10 | End: 2025-07-10

## 2025-07-10 RX ORDER — SODIUM CHLORIDE 9 MG/ML
INJECTION, SOLUTION INTRAVENOUS PRN
Status: DISCONTINUED | OUTPATIENT
Start: 2025-07-10 | End: 2025-07-10

## 2025-07-10 RX ORDER — MISOPROSTOL 200 UG/1
200 TABLET ORAL EVERY 6 HOURS PRN
Status: DISCONTINUED | OUTPATIENT
Start: 2025-07-10 | End: 2025-07-12 | Stop reason: HOSPADM

## 2025-07-10 RX ORDER — TERBUTALINE SULFATE 1 MG/ML
0.25 INJECTION SUBCUTANEOUS ONCE
Status: DISCONTINUED | OUTPATIENT
Start: 2025-07-10 | End: 2025-07-10

## 2025-07-10 RX ORDER — ONDANSETRON 4 MG/1
4 TABLET, ORALLY DISINTEGRATING ORAL EVERY 6 HOURS PRN
Status: DISCONTINUED | OUTPATIENT
Start: 2025-07-10 | End: 2025-07-12 | Stop reason: HOSPADM

## 2025-07-10 RX ORDER — SODIUM CHLORIDE 0.9 % (FLUSH) 0.9 %
5-40 SYRINGE (ML) INJECTION PRN
Status: DISCONTINUED | OUTPATIENT
Start: 2025-07-10 | End: 2025-07-10

## 2025-07-10 RX ORDER — SODIUM CHLORIDE 0.9 % (FLUSH) 0.9 %
5-40 SYRINGE (ML) INJECTION EVERY 12 HOURS SCHEDULED
Status: DISCONTINUED | OUTPATIENT
Start: 2025-07-10 | End: 2025-07-12 | Stop reason: HOSPADM

## 2025-07-10 RX ORDER — MISOPROSTOL 200 UG/1
400 TABLET ORAL PRN
Status: DISCONTINUED | OUTPATIENT
Start: 2025-07-10 | End: 2025-07-12 | Stop reason: HOSPADM

## 2025-07-10 RX ORDER — LANOLIN
CREAM (ML) TOPICAL PRN
Status: DISCONTINUED | OUTPATIENT
Start: 2025-07-10 | End: 2025-07-12 | Stop reason: HOSPADM

## 2025-07-10 RX ORDER — ONDANSETRON 4 MG/1
4 TABLET, ORALLY DISINTEGRATING ORAL EVERY 8 HOURS PRN
Status: DISCONTINUED | OUTPATIENT
Start: 2025-07-10 | End: 2025-07-10

## 2025-07-10 RX ORDER — DOCUSATE SODIUM 100 MG/1
100 CAPSULE, LIQUID FILLED ORAL 2 TIMES DAILY
Status: DISCONTINUED | OUTPATIENT
Start: 2025-07-10 | End: 2025-07-12 | Stop reason: HOSPADM

## 2025-07-10 RX ORDER — SODIUM CHLORIDE, SODIUM LACTATE, POTASSIUM CHLORIDE, AND CALCIUM CHLORIDE .6; .31; .03; .02 G/100ML; G/100ML; G/100ML; G/100ML
500 INJECTION, SOLUTION INTRAVENOUS PRN
Status: DISCONTINUED | OUTPATIENT
Start: 2025-07-10 | End: 2025-07-10

## 2025-07-10 RX ORDER — LIDOCAINE HYDROCHLORIDE AND EPINEPHRINE 15; 5 MG/ML; UG/ML
INJECTION, SOLUTION EPIDURAL
Status: DISCONTINUED | OUTPATIENT
Start: 2025-07-10 | End: 2025-07-10 | Stop reason: SDUPTHER

## 2025-07-10 RX ORDER — SODIUM CHLORIDE, SODIUM LACTATE, POTASSIUM CHLORIDE, CALCIUM CHLORIDE 600; 310; 30; 20 MG/100ML; MG/100ML; MG/100ML; MG/100ML
INJECTION, SOLUTION INTRAVENOUS CONTINUOUS
Status: DISCONTINUED | OUTPATIENT
Start: 2025-07-10 | End: 2025-07-10

## 2025-07-10 RX ORDER — ROPIVACAINE HYDROCHLORIDE 2 MG/ML
INJECTION, SOLUTION EPIDURAL; INFILTRATION; PERINEURAL
Status: DISCONTINUED | OUTPATIENT
Start: 2025-07-10 | End: 2025-07-10 | Stop reason: SDUPTHER

## 2025-07-10 RX ORDER — METHYLERGONOVINE MALEATE 0.2 MG/ML
200 INJECTION INTRAVENOUS PRN
Status: DISCONTINUED | OUTPATIENT
Start: 2025-07-10 | End: 2025-07-12 | Stop reason: HOSPADM

## 2025-07-10 RX ORDER — LOPERAMIDE HYDROCHLORIDE 2 MG/1
2 CAPSULE ORAL PRN
Status: DISCONTINUED | OUTPATIENT
Start: 2025-07-10 | End: 2025-07-12 | Stop reason: HOSPADM

## 2025-07-10 RX ORDER — TRANEXAMIC ACID 10 MG/ML
1000 INJECTION, SOLUTION INTRAVENOUS
Status: DISCONTINUED | OUTPATIENT
Start: 2025-07-10 | End: 2025-07-12 | Stop reason: HOSPADM

## 2025-07-10 RX ORDER — IBUPROFEN 800 MG/1
800 TABLET, FILM COATED ORAL EVERY 8 HOURS
Status: DISCONTINUED | OUTPATIENT
Start: 2025-07-10 | End: 2025-07-12 | Stop reason: HOSPADM

## 2025-07-10 RX ADMIN — OXYCODONE 5 MG: 5 TABLET ORAL at 23:13

## 2025-07-10 RX ADMIN — WITCH HAZEL: 500 SOLUTION RECTAL; TOPICAL at 16:53

## 2025-07-10 RX ADMIN — Medication: at 16:53

## 2025-07-10 RX ADMIN — ACETAMINOPHEN 1000 MG: 500 TABLET, FILM COATED ORAL at 21:44

## 2025-07-10 RX ADMIN — IBUPROFEN 800 MG: 800 TABLET, FILM COATED ORAL at 16:53

## 2025-07-10 RX ADMIN — SODIUM CHLORIDE, SODIUM LACTATE, POTASSIUM CHLORIDE, CALCIUM CHLORIDE AND DEXTROSE MONOHYDRATE: 5; 600; 310; 30; 20 INJECTION, SOLUTION INTRAVENOUS at 07:09

## 2025-07-10 RX ADMIN — DOCUSATE SODIUM 100 MG: 100 CAPSULE ORAL at 21:44

## 2025-07-10 RX ADMIN — LIDOCAINE HYDROCHLORIDE,EPINEPHRINE BITARTRATE 3 ML: 15; .005 INJECTION, SOLUTION EPIDURAL; INFILTRATION; INTRACAUDAL; PERINEURAL at 08:13

## 2025-07-10 RX ADMIN — ROPIVACAINE HYDROCHLORIDE 8 ML/HR: 2 INJECTION, SOLUTION EPIDURAL; INFILTRATION at 08:13

## 2025-07-10 RX ADMIN — OXYTOCIN 2 MILLI-UNITS/MIN: 10 INJECTION, SOLUTION INTRAMUSCULAR; INTRAVENOUS at 07:40

## 2025-07-10 RX ADMIN — LIDOCAINE HYDROCHLORIDE,EPINEPHRINE BITARTRATE 2 ML: 15; .005 INJECTION, SOLUTION EPIDURAL; INFILTRATION; INTRACAUDAL; PERINEURAL at 08:14

## 2025-07-10 ASSESSMENT — PAIN DESCRIPTION - ORIENTATION
ORIENTATION: LOWER
ORIENTATION: LOWER

## 2025-07-10 ASSESSMENT — PAIN DESCRIPTION - DESCRIPTORS
DESCRIPTORS: SORE
DESCRIPTORS: CRAMPING

## 2025-07-10 ASSESSMENT — PAIN DESCRIPTION - LOCATION
LOCATION: BACK;HIP
LOCATION: ABDOMEN

## 2025-07-10 ASSESSMENT — PAIN SCALES - GENERAL
PAINLEVEL_OUTOF10: 2
PAINLEVEL_OUTOF10: 4

## 2025-07-10 NOTE — PROGRESS NOTES
Patient up to bathroom with assistance.  Marci-care taught and completed. Questions encouraged and answered.  Patient ambulating without difficulty, encouraged to call for needs or concerns. Verbalizes understanding.

## 2025-07-10 NOTE — L&D DELIVERY NOTE
Simone Arroyo [[id number]]      Labor Events     Labor: No   Steroids: None  Cervical Ripening Date/Time:      Antibiotics Received during Labor: No  Rupture Identifier: Sac 1  Rupture Date/Time:  7/10/25 08:30:00   Rupture Type: AROM  Fluid Color: Clear  Fluid Odor: None  Fluid Volume: Moderate  Induction: AROM, Oxytocin  Augmentation: Oxytocin  Labor Complications: None       Anesthesia    Method: Epidural       Labor Event Times      Labor onset date/time:  7/10/25 07:40:00     Dilation complete date/time:  7/10/25 12:50:00     Start pushing date/time:  7/10/2025 12:55:47   Decision date/time (emergent ):            Delivery Details      Delivery Date: 7/10/25 Delivery Time: 13:04:00   Delivery Type: Vaginal, Spontaneous               Presentation    Presentation: Vertex  Position: Left  _: Occiput  _: Anterior       Shoulder Dystocia    Shoulder Dystocia Present?: No       Assisted Delivery Details    Forceps Attempted?: No  Vacuum Extractor Attempted?: No                           Cord    Vessels: 3 Vessels  Complications: None  Delayed Cord Clamping?: Yes  Cord Clamped Date/Time: 7/10/2025 13:05:00  Cord Blood Disposition: Lab  Gases Sent?: No              Placenta    Date/Time: 7/10/2025 13:07:00  Removal: Expressed  Appearance: Intact  Disposition: Discarded       Lacerations    Episiotomy: None  Perineal Lacerations: 2nd  Other Lacerations: no non-perineal laceration  Number of Repair Packets: 1       Vaginal Counts    Initial Count Personnel: MARIA GUADALUPE  Initial Count Verified By: LIZZIE  Intial Sponge Count: Correct Intial Needles Count: Correct    Final Sponges Count: Correct Final Needles  Count: Correct    Final Count Personnel: MARIA GUADALUPE  Final Count Verified By: LIZZIE  Accurate Final Count?: Yes       Blood Loss  Mother: Mal Arroyo #[id number]     Start of Mother's Information      Delivery Blood Loss   Intrapartum & Postpartum: 07/10/25

## 2025-07-10 NOTE — PROGRESS NOTES
0800 Anesthesia in room. Pt to side of bed. Procedure explained, consent obtained.   0804 Time out per MD.  0813 Epidural cath in place. Test dose given. Serial BP as documented.

## 2025-07-10 NOTE — PROGRESS NOTES
[facility]  [address], [place] [address]  [phone]    Karthik Brand MD, FACOG  Paloma Richardson COLLEEN-BC    Labor Progress Note    Pt tolerating labor well.    Vitals:    07/10/25 0817 07/10/25 0819 07/10/25 0825 07/10/25 0831   BP: (!) 110/55 (!) 99/50 (!) 97/47 (!) 95/54   Pulse: 94 80 89 87   Resp:       Temp:       TempSrc:       SpO2:           FHT's:  Baseline 's, reactive  [place]:  ctx q 2-4 min    SVE:  5/70/-1  Membranes:  AROM - clear    Assessement and Plan: Mal Rowan 20 y.o.  at 37w4d admitted for IOL for IUGR    Continue pitocin augmentation     Pain control: epidural    GBS: neg      Karthik Brand MD  8:39 AM  07/10/25

## 2025-07-10 NOTE — ANESTHESIA PROCEDURE NOTES
Epidural Block    Patient location during procedure: OB  Start time: 7/10/2025 8:04 AM  End time: 7/10/2025 8:13 AM  Reason for block: labor epidural  Staffing  Performed: anesthesiologist   Anesthesiologist: Ras Meredith MD  Performed by: Ras Meredith MD  Authorized by: Ras Meredith MD    Epidural  Patient position: sitting  Prep: ChloraPrep  Patient monitoring: continuous pulse ox and frequent blood pressure checks  Approach: midline  Location: L4-5  Injection technique: KARSON saline  Provider prep: mask and sterile gloves  Needle  Needle type: Tuohy   Needle gauge: 17 G  Epidural needle length (in): 10 cm.  Needle insertion depth: 4.5 cm  Catheter type: end hole  Catheter size: 19 G  Catheter at skin depth: 8.5 cm  Test dose: negativeCatheter Secured: tegaderm and tape (liquid adhesive)  Assessment  Hemodynamics: stable  Attempts: 1  Outcomes: patient tolerated procedure well and uncomplicated  Additional Notes  3 cc 1% lidocaine local at needle insertion site.    Risks discussed including damage to muscle or nerve, post-procedure headache, bleeding, infection  Preanesthetic Checklist  Completed: patient identified, IV checked, risks and benefits discussed, equipment checked, pre-op evaluation, timeout performed, anesthesia consent given, oxygen available and monitors applied/VS acknowledged

## 2025-07-10 NOTE — PROGRESS NOTES
0700 Admitted for induction of labor. Admission assessment as documented. Oriented to room, call system and pain scale. Plan of care reviewed and questions answered. Consents signed, identification band verified and placed. IV placed, blood work drawn per order and sent to lab by previous RN.     1840 PT request epidural before SVE and AROM. Clarified with MD. Crooks to get now.

## 2025-07-10 NOTE — H&P
[facility]  [address], [place] [address]  [phone]    Karthik Brand MD, FACOG  Paloma Richardson COLLEEN-BC      [facility] OB H&P      Assessment/Plan    Patient Active Problem List    Diagnosis Date Noted    High-risk pregnancy in third trimester 05/27/2025     Overview Note:     05/27/25 [facility]:  Reassuring fetal status. Growth today AC 5%, stable FGR; For full details review formal ultrasound report.    06/17/25 [facility]:  Reassuring fetal status. Growth today lagging AC (3%); For full details review formal ultrasound report.    06/23/25 [facility]: Reassuring fetal status. BPP 8/8, KLARISSA- WNL. For full details review formal ultrasound report.   06/30/25 [facility]: Reassuring fetal status. Growth today lagging AC; KLARISSA- WNL, BPP- 8/8. For full details review formal ultrasound report.   Plan delivery for 37th week.           Anemia affecting pregnancy in third trimester 05/08/2025     Overview Note:     Additional Fe    6/11/25: not toleration PO iron, anemia studies today. Plan infusion if indicated (Hgb 8.4)  06/17/25 [facility]: Scheduled for Venofer Infusion 6/19/25  7/3/25: venofer infusion x5 (last completed 7/2/2025)        Poor fetal growth affecting management of mother in third trimester 05/07/2025     Overview Note:     5/7/25: EFW 6.3%, AC 4.4%, KLARISSA nl, dopplers nl, BPP 8/8, vertex.   05/27/25 [facility]:  AC 5%, KLARISSA nl, BPP 8/8.  N/V resolved approx 20 weeks. Reports good diet/appetite. Denies nicotine use. BP normal. Protein handout reviewed. Has pediasure shakes at home. Advised to apply for WIC as they may cover shakes. D/w Dr Victoria. Plan repeat growth 3 weeks at [facility]. Strict FKCs reviewed  6/23/2025 [facility]: AC 3% on 6/17/23.  Twice weekly testing shared  06/30/25 [facility]: AC- 6%, EFW- 13%.  Dr. Victoria rec delivery in 37th week  7/3/25: d/w Dr. Brand. Schedule for IOL 7/10/2025 (3/70/-1)        Asthma during pregnancy 03/14/2025     Overview Note:     Rare albuterol usage      Primigravida in third

## 2025-07-10 NOTE — ANESTHESIA PRE PROCEDURE
injection 4 mg  4 mg IntraVENous Q6H PRN Karthik Brand MD        sodium chloride flush 0.9 % injection 5-40 mL  5-40 mL IntraVENous PRN Karthik Brand MD        0.9 % sodium chloride infusion   IntraVENous PRN Karthik Brand MD        miSOPROStol (CYTOTEC) tablet 400 mcg  400 mcg Buccal PRN Karthik Brand MD        oxytocin (PITOCIN) 30 units in 500 mL infusion  999 dalton-units/min IntraVENous Continuous PRN Karthik Brand MD        And    oxytocin (PITOCIN) 10 unit bolus from the bag  10 Units IntraVENous PRN Karthik Brand MD         Facility-Administered Medications Ordered in Other Encounters   Medication Dose Route Frequency Provider Last Rate Last Admin    ROPivacaine (NAROPIN) 0.2% injection 0.2%   Epidural Continuous PRN Ras Meredith MD 8 mL/hr at 07/10/25 0813 8 mL/hr at 07/10/25 0813    Lidocaine-EPINEPHrine (PF) 1.5 %-1:[id number]   Epidural Once PRN Ras Meredith MD   2 mL at 07/10/25 0814       Allergies:    Allergies   Allergen Reactions    Azithromycin Hives and Shortness Of Breath    Cefdinir Hives, Shortness Of Breath and Rash    Pineapple Extract Hives    Amoxicillin-Pot Clavulanate Rash    Other Rash and Other (See Comments)     Pears        Problem List:    Patient Active Problem List   Diagnosis Code    Primigravida in third trimester Z34.03    Asthma during pregnancy O99.519, J45.909    Poor fetal growth affecting management of mother in third trimester O36.5930    Anemia affecting pregnancy in third trimester O99.013    High-risk pregnancy in third trimester O09.93       Past Medical History:        Diagnosis Date    Eczema     Migraine without aura 02/12/2021    Mild intermittent asthma 02/12/2021       Past Surgical History:  No past surgical history on file.    Social History:    Social History     Tobacco Use    Smoking status: Never     Passive exposure: Yes    Smokeless tobacco: Never   Substance Use Topics    Alcohol use: Not Currently

## 2025-07-10 NOTE — L&D DELIVERY NOTE
[facility]  [address], [place] [address]  [phone]    Karthik Brand MD, FACOG  Paloma Richardson [facility]  Delivery Note    Present for entire delivery.  Details in delivery summary.    .  Viable female infant, APGARS 8,9 .   Weight 5 lbs., 13 oz.    EBL:  200 mL  Pain mgmt:   epidural    Placenta spont, intact, 3VC.   2nd degree midline perineal laceration repaired in usual fashion with 3-0 vicryl rapide    Pt and infant stable.      Karthik Brand MD   1:28 PM  07/10/25

## 2025-07-11 PROCEDURE — 1100000000 HC RM PRIVATE

## 2025-07-11 PROCEDURE — 6370000000 HC RX 637 (ALT 250 FOR IP): Performed by: OBSTETRICS & GYNECOLOGY

## 2025-07-11 RX ADMIN — DOCUSATE SODIUM 100 MG: 100 CAPSULE ORAL at 10:27

## 2025-07-11 RX ADMIN — IBUPROFEN 800 MG: 800 TABLET, FILM COATED ORAL at 20:38

## 2025-07-11 RX ADMIN — IBUPROFEN 800 MG: 800 TABLET, FILM COATED ORAL at 02:32

## 2025-07-11 RX ADMIN — ACETAMINOPHEN 1000 MG: 500 TABLET, FILM COATED ORAL at 05:51

## 2025-07-11 RX ADMIN — IBUPROFEN 800 MG: 800 TABLET, FILM COATED ORAL at 10:26

## 2025-07-11 RX ADMIN — ACETAMINOPHEN 1000 MG: 500 TABLET, FILM COATED ORAL at 15:46

## 2025-07-11 RX ADMIN — OXYCODONE 5 MG: 5 TABLET ORAL at 15:46

## 2025-07-11 RX ADMIN — DOCUSATE SODIUM 100 MG: 100 CAPSULE ORAL at 20:38

## 2025-07-11 ASSESSMENT — PAIN DESCRIPTION - DESCRIPTORS
DESCRIPTORS: CRAMPING
DESCRIPTORS: SORE
DESCRIPTORS: CRAMPING

## 2025-07-11 ASSESSMENT — PAIN SCALES - GENERAL
PAINLEVEL_OUTOF10: 6
PAINLEVEL_OUTOF10: 1
PAINLEVEL_OUTOF10: 1

## 2025-07-11 ASSESSMENT — PAIN DESCRIPTION - ORIENTATION
ORIENTATION: LOWER
ORIENTATION: ANTERIOR;LOWER

## 2025-07-11 ASSESSMENT — PAIN DESCRIPTION - LOCATION
LOCATION: ABDOMEN;PERINEUM
LOCATION: ABDOMEN
LOCATION: ABDOMEN

## 2025-07-11 NOTE — LACTATION NOTE
This note was copied from a baby's chart.  Flange fit can be an important part of comfortable and efficient pumping.   Standard 24mm flanges may be ok, but new information suggests using a breastshield (flange) closest to your nipple diameter measurement maybe best.  Nipple diameter can fluctuate throughout breastfeeding duration.  Suggest ordering a flange insert variety pack (any brand).  The variety pack contains flange inserts in  13mm, 15mm, 17mm, 19mm and 21mm sizes.  Flange inserts will fit in ANY 24 mm hard flange no matter what brand the pump is.  This includes plug-in and wireless pumps.      15 mm L     14 mm R.       Based on your above measurements (exact measurement/+1-2 mm), suggest trying 15 or 17 mm flanges to start.  Some people prefer a hard sided flange.  Once you feel you have found your ideal fit, you can order a hard sided flange compatible with your pump.  You will likely have to order off brand and depending on your pump, a hard flange in a smaller size may not be available.  Olive oil or coconut oil can be used before pumping to help with friction.  Be very aware of centering nipple in the flange opening.  With a tighter fit it can take practice to get nipple centered properly.     Call [facility] Lactation Services with any questions.  [phone]

## 2025-07-11 NOTE — PROGRESS NOTES
Progress Note                               Patient: Mal Rowan MRN: [id number]  SSN: [id number]    YOB: 2005  Age: 20 y.o.  Sex: female      Postpartum Day Number 1    Subjective:     Patient doing well without significant complaints. Ambulating, voiding without difficulty Patient reports normal lochia.. Infant: Breastfeeding and/or pumping    Objective:     Patient Vitals for the past 18 hrs:   Temp Pulse Resp BP SpO2   25 0729 97.5 °F (36.4 °C) 64 16 108/65 97 %   07/10/25 2333 98.4 °F (36.9 °C) 71 14 108/66 100 %        Temp (24hrs), Av °F (36.7 °C), Min:97.5 °F (36.4 °C), Max:98.6 °F (37 °C)      Physical Exam:    Patient without distress. Neuro / Psych grossly WNL A&O X 3 Lungs: normal respiratory effort    Lab/Data Review:  All lab results for the last 24 hours reviewed.    Assessment and Plan:     Patient appears to be having an uncomplicated postpartum course. Continue routine perineal care and maternal education.    Vernell Sandoval MD

## 2025-07-11 NOTE — LACTATION NOTE
This note was copied from a baby's chart.  Flange fit can be an important part of comfortable and efficient pumping.   Standard 24mm flanges may be ok, but new information suggests using a breastshield (flange) closest to your nipple diameter measurement maybe best.  Nipple diameter can fluctuate throughout breastfeeding duration.  Suggest ordering a flange insert variety pack (any brand).  The variety pack contains flange inserts in  13mm, 15mm, 17mm, 19mm and 21mm sizes.  Flange inserts will fit in ANY 24 mm hard flange no matter what brand the pump is.  This includes plug-in and wireless pumps.      15 mm L     14 mm R.       Based on your above measurements (exact measurement/+1-2 mm), suggest trying 15 or 17 mm flanges to start.  Some people prefer a hard sided flange.  Once you feel you have found your ideal fit, you can order a hard sided flange compatible with your pump.  You will likely have to order off brand and depending on your pump, a hard flange in a smaller size may not be available.  Olive oil or coconut oil can be used before pumping to help with friction.  Be very aware of centering nipple in the flange opening.  With a tighter fit it can take practice to get nipple centered properly.     Call [facility] Lactation Services with any questions.  [phone]

## 2025-07-11 NOTE — LACTATION NOTE
This note was copied from a baby's chart.  Mom reports baby just fed well on L side and was able to maintain latch.  Assisted with breastfeeding in football on R.  Mom positions well, but baby fed poorly.  Did improve latch some with better positioning, but seemed shallow and only stayed on about 3 minutes.  Was mostly on and off.  Demonstrated manual lip flange.  Noted baby is LGA.  Discussed need to pump if baby not feeding well on both sides.  Discussed indications for supplementation and suggested following up with Ped tomorrow. Currently weight loss, output and bilirubin are all WNL, but first time mom, exclusively breastfeeding, LGA needs close follow up.  See progress notes for feeding plan.  Paper copy given to mom.    Mom and baby are going home today.  Continue to offer the breast without restriction.  Mom's milk should be fully in over the next few days.  Reviewed engorgement precautions.  Hand Expression has been demoed and written hand-out reviewed.  As milk comes in baby will be more alert at the breast and swallows will be more obvious.  Breasts may feel softer once baby has finished nursing.  Baby should be back to birth weight by 2 weeks of age.  And then gain on average 1 oz per day for the next 2-3 months.  Reviewed babies should be exclusively breastfeeding for the first 6 months and that breastfeeding should continue after introduction of appropriate complimentary foods after 6 months.  Initial output should be at least 1 wet and 1 bowel movement for each day old baby is.  By day 5-7 once milk is fully in baby will consistently have 6 or more soaking wet diapers and about 4 bowel movement.  Some babies have a bowel movement with every feeding and some have 1-3 large bowel movements each day.  Inadequate output may indicate inadequate feedings and should be reported to your Pediatrician.  Bowel habits may change as baby gets older.  Encouraged follow-up at Pediatrician in 1-2 days, no later than

## 2025-07-11 NOTE — ANESTHESIA POSTPROCEDURE EVALUATION
Department of Anesthesiology  Postprocedure Note    Patient: Mal Rowan  MRN: [id number]  YOB: 2005  Date of evaluation: 7/11/2025    Procedure Summary       Date: 07/10/25 Room / Location:     Anesthesia Start: 0800 Anesthesia Stop: 1304    Procedure: Labor Analgesia Diagnosis:     Scheduled Providers:  Responsible Provider: Ras Meredith MD    Anesthesia Type: epidural ASA Status: 2            Anesthesia Type: No value filed.    Marlyn Phase I:      Marlyn Phase II:      Anesthesia Post Evaluation    Patient location during evaluation: floor  Patient participation: complete - patient participated  Level of consciousness: awake  Airway patency: patent  Nausea & Vomiting: no nausea and no vomiting  Cardiovascular status: blood pressure returned to baseline  Respiratory status: acceptable  Hydration status: euvolemic  Pain management: adequate    No notable events documented.

## 2025-07-11 NOTE — LACTATION NOTE
This note was copied from a baby's chart.  In to see mom and infant for first time. Mom desires to try feed by a combination of things: DBF, some pumping and formula as well. Reviewed principle of supply and demand in bringing in milk supply. Showed mom how for her to hand express. Drops of milk brought to surface. Reviewed different breast feeding positions and importance of wide, deep latch. Lactation assisted mom in her bringing baby to right breast in cross cradle hold. Baby latched and fed a few light sucks here and there for 6 minutes but nothing strong and sustained. Mom appears uncomfortable w/ direct breast feeding and asked to start pumping. Full instruction provided on how to use hospital grade pump, collect milk and feed back to baby. Measured nipple and she pumped x 15 minutes in 18 mm flanges. She got drops and lactation helped feed back to baby on finger. Grandma also fed baby during pump session 10 mls of milk. Washed pump parts and set up for next time. Mom not sure if likes pumping either. Told her she is in charge of how she wants to feed baby. If does pump reviewed normal pump volumes for few days of life. Mom has pump she may bring in tomorrow for pump demo.

## 2025-07-11 NOTE — LACTATION NOTE
This note was copied from a baby's chart.  Individualized Feeding Plan for Breastfeeding   Lactation Services [phone]    As much as possible, hold your baby on your chest so baby’s bare skin is against your bare skin with a blanket covering baby’s back, especially 30 minutes before it is time for baby to eat.    Watch for early feeding cues such as, licking lips, sucking motions, rooting, hands to mouth. Crying is a late feeding cue.      Feed your baby at least 8 times in 24 hours, or more if your baby is showing feeding cues.  If baby is sleepy put baby skin to skin and watch for hunger cues.  To rouse baby: unwrap, undress, massage hands, feet, & back, change diaper, gently change baby’s position from lying to sitting.   15-20 minutes on the first breast of active breastfeeding is considered a good feeding. Good, active breastfeeding is when baby is alert, tugging the nipple, their ear may move, and you can hear swallows.  Allow baby to finish the first side before changing sides.     Sleeping at the breast or only brief, light sucks should not be considered a good, full breastfeed.  At each feedin.  Baby needs to NURSE WELL x 15-20 minutes on at least first breast, burp and offer 2nd breast at every feeding.  If no sustained latch only attempt at breast for 10 minutes.     If baby does not latch on and feed well on at least one side, you should:     2. Double pump for 15 minutes with breast massage and compression.  Hand express for an additional 2-3 minutes per side. Pump after each feeding attempt or poor feeding, up to 8 times per day. If you are not putting baby to the breast you need to pump 8 times a day. Pump every 3 hours.      3. Give baby all of the breast milk you obtain using a straight syringe or  curved syringe.    If baby does NOT have enough wet and dirty diapers per day, is jaundiced/lethargic, or has significant weight loss AND you do NOT pump enough milk for each feeding (per

## 2025-07-12 VITALS
HEART RATE: 64 BPM | RESPIRATION RATE: 16 BRPM | TEMPERATURE: 98.1 F | OXYGEN SATURATION: 98 % | DIASTOLIC BLOOD PRESSURE: 63 MMHG | SYSTOLIC BLOOD PRESSURE: 102 MMHG

## 2025-07-12 PROCEDURE — 6370000000 HC RX 637 (ALT 250 FOR IP): Performed by: OBSTETRICS & GYNECOLOGY

## 2025-07-12 RX ORDER — OXYCODONE HYDROCHLORIDE 5 MG/1
5 TABLET ORAL EVERY 6 HOURS PRN
Qty: 12 TABLET | Refills: 0 | Status: SHIPPED | OUTPATIENT
Start: 2025-07-12 | End: 2025-07-15

## 2025-07-12 RX ORDER — IBUPROFEN 800 MG/1
800 TABLET, FILM COATED ORAL EVERY 6 HOURS PRN
Qty: 60 TABLET | Refills: 1 | Status: SHIPPED | OUTPATIENT
Start: 2025-07-12

## 2025-07-12 RX ADMIN — ACETAMINOPHEN 1000 MG: 500 TABLET, FILM COATED ORAL at 08:08

## 2025-07-12 RX ADMIN — ACETAMINOPHEN 1000 MG: 500 TABLET, FILM COATED ORAL at 00:12

## 2025-07-12 RX ADMIN — DOCUSATE SODIUM 100 MG: 100 CAPSULE ORAL at 08:09

## 2025-07-12 RX ADMIN — IBUPROFEN 800 MG: 800 TABLET, FILM COATED ORAL at 05:00

## 2025-07-12 RX ADMIN — OXYCODONE 5 MG: 5 TABLET ORAL at 00:12

## 2025-07-12 ASSESSMENT — PAIN SCALES - GENERAL: PAINLEVEL_OUTOF10: 5

## 2025-07-12 ASSESSMENT — PAIN DESCRIPTION - LOCATION: LOCATION: PERINEUM

## 2025-07-12 NOTE — LACTATION NOTE
This note was copied from a baby's chart.  Mom and baby are going home today.  Continue to offer the breast without restriction.  Mom's milk should be fully in over the next few days.  Reviewed engorgement precautions.  Hand Expression has been demoed and written hand-out reviewed.  As milk comes in baby will be more alert at the breast and swallows will be more obvious.  Breasts may feel softer once baby has finished nursing.  Baby should be back to birth weight by 2 weeks of age.  And then gain on average 1 oz per day for the next 2-3 months.  Reviewed babies should be exclusively breastfeeding for the first 6 months and that breastfeeding should continue after introduction of appropriate complimentary foods after 6 months.  Initial output should be at least 1 wet and 1 bowel movement for each day old baby is.  By day 5-7 once milk is fully in baby will consistently have 6 or more soaking wet diapers and about 4 bowel movement.  Some babies have a bowel movement with every feeding and some have 1-3 large bowel movements each day.  Inadequate output may indicate inadequate feedings and should be reported to your Pediatrician.  Bowel habits may change as baby gets older.  Encouraged follow-up at Pediatrician in 1-2 days, no later than 1 week of age.  Call [facility] for any questions as needed or to set up an OP visit.  OP phone calls are returned within 24 hours. Community Breastfeeding Resource List given.    Mainly bottle feeding formula. Has pumped x 1 in 24 hours, plans to pump at home. Has pump for home use. Already has printed feeding plan for guidance with volume needs. Gave more syringes and formula per patient request. No other needs or questions. Patient did not talk, patient mother spoke with LUZ ELENA.

## 2025-07-12 NOTE — DISCHARGE SUMMARY
Obstetrical Discharge Summary     Name: Mal Rowan MRN: [id number]  SSN: [id number]    YOB: 2005  Age: 20 y.o.  Sex: female      Allergies: Azithromycin, Cefdinir, Pineapple extract, Amoxicillin-pot clavulanate, and Other    Admit Date: 7/10/2025    Discharge Date: 2025     Admitting Physician: Karthik Brand MD     Attending Physician:  Karthik Brand MD     * Admission Diagnoses: Primigravida in third trimester [Z34.03]    * Discharge Diagnoses: Primigravida in third trimester [Z34.03]              Additional Diagnoses:   Hospital Problems           Last Modified POA    * (Principal) Primigravida in third trimester 2025 Yes    Overview Addendum 2025  3:19 PM by Paloma Richardson APRN - CNP   EDC by LMP confirmed by  week US    1/10/25:  NIPT low risk, FEMALE, CF, SMA, DMD, Fragile X all neg  25:  AFP neg  25: tdap today           Normal delivery at term 7/10/2025 Yes        All lab results for the last 24 hours reviewed.     Immunizations:    Immunization History   Administered Date(s) Administered    COVID-19, PFIZER GRAY top, DO NOT Dilute, (age 12 y+), IM, 30 mcg/0.3 mL 2022    TDaP, ADACEL (age 10y-64y), BOOSTRIX (age 10y+), IM, 0.5mL 2025       Group Beta Strep: No components found for: \"OBEXTGRBS\"        Lab Results   Component Value Date/Time    ABORH O POSITIVE 07/10/2025 06:59 AM      Immunization History   Administered Date(s) Administered    COVID-19, PFIZER GRAY top, DO NOT Dilute, (age 12 y+), IM, 30 mcg/0.3 mL 2022    TDaP, ADACEL (age 10y-64y), BOOSTRIX (age 10y+), IM, 0.5mL 2025       * Procedures:     Progress Note                               Patient: Mal Rowan MRN: [id number]  SSN: [id number]    YOB: 2005  Age: 20 y.o.  Sex: female      Postpartum Day Number 2    Subjective:     Patient doing well without significant complaints. Ambulating, voiding without difficulty Patient  reports normal lochia.. Infant: Breastfeeding and/or pumping    Objective:     Patient Vitals for the past 18 hrs:   Temp Pulse Resp BP SpO2   25 0738 98.1 °F (36.7 °C) 64 16 102/63 98 %   25 0008 97.7 °F (36.5 °C) 70 14 104/63 98 %        Temp (24hrs), Av.9 °F (36.6 °C), Min:97.7 °F (36.5 °C), Max:98.1 °F (36.7 °C)      Physical Exam:    Patient without distress. Neuro / Psych grossly WNL A&O X 3 Lungs: normal respiratory effort    Lab/Data Review:  All lab results for the last 24 hours reviewed.    Assessment and Plan:     Patient appears to be having an uncomplicated postpartum course. Continue routine perineal care and maternal education., Will discharge home today.    Vernell Sandoval MD            * Discharge Condition: Good    * Hospital Course: Normal hospital course following the delivery.    * Disposition: Home    Discharge Medications:      Medication List        START taking these medications      ibuprofen 800 MG tablet  Commonly known as: ADVIL;MOTRIN  Take 1 tablet by mouth every 6 hours as needed for Pain     oxyCODONE 5 MG immediate release tablet  Commonly known as: ROXICODONE  Take 1 tablet by mouth every 6 hours as needed for Pain for up to 3 days. Max Daily Amount: 20 mg            CONTINUE taking these medications      acetaminophen 325 MG tablet  Commonly known as: TYLENOL     albuterol sulfate  (90 Base) MCG/ACT inhaler  Commonly known as: Ventolin HFA  Inhale 2 puffs into the lungs 4 times daily as needed for Wheezing     promethazine 12.5 MG tablet  Commonly known as: PHENERGAN               Where to Get Your Medications        These medications were sent to [id number] - [place], [place] - [address] - P [phone] - F [phone] [address], [facility] [address]      Phone: [phone]   ibuprofen 800 MG tablet  oxyCODONE 5 MG immediate release tablet         * Follow-up Care/Patient Instructions:    No follow-up provider

## 2025-07-21 ENCOUNTER — HOSPITAL ENCOUNTER (INPATIENT)
Age: 20
LOS: 1 days | Discharge: HOME | DRG: 561 | End: 2025-07-23
Attending: OBSTETRICS & GYNECOLOGY | Admitting: OBSTETRICS & GYNECOLOGY
Payer: COMMERCIAL

## 2025-07-21 LAB
ALBUMIN SERPL-MCNC: 3.5 G/DL (ref 3.5–5)
ALBUMIN/GLOB SERPL: 1.1 (ref 1–1.9)
ALP SERPL-CCNC: 123 U/L (ref 35–104)
ALT SERPL-CCNC: 21 U/L (ref 8–45)
ANION GAP SERPL CALC-SCNC: 12 MMOL/L (ref 7–16)
AST SERPL-CCNC: 16 U/L (ref 15–37)
BILIRUB SERPL-MCNC: 0.4 MG/DL (ref 0–1.2)
BUN SERPL-MCNC: 6 MG/DL (ref 6–23)
CALCIUM SERPL-MCNC: 9.1 MG/DL (ref 8.8–10.2)
CHLORIDE SERPL-SCNC: 104 MMOL/L (ref 98–107)
CO2 SERPL-SCNC: 25 MMOL/L (ref 20–29)
CREAT SERPL-MCNC: 0.73 MG/DL (ref 0.6–1.1)
CREAT UR-MCNC: 52.2 MG/DL (ref 28–217)
ERYTHROCYTE [DISTWIDTH] IN BLOOD BY AUTOMATED COUNT: 21.1 % (ref 11.9–14.6)
GLOBULIN SER CALC-MCNC: 3.1 G/DL (ref 2.3–3.5)
GLUCOSE SERPL-MCNC: 90 MG/DL (ref 70–99)
HCT VFR BLD AUTO: 37.3 % (ref 35.8–46.3)
HGB BLD-MCNC: 12.4 G/DL (ref 11.7–15.4)
LDH SERPL L TO P-CCNC: 228 U/L (ref 127–281)
MCH RBC QN AUTO: 27.1 PG (ref 26.1–32.9)
MCHC RBC AUTO-ENTMCNC: 33.2 G/DL (ref 31.4–35)
MCV RBC AUTO: 81.4 FL (ref 82–102)
NRBC # BLD: 0 K/UL (ref 0–0.2)
PLATELET # BLD AUTO: 355 K/UL (ref 150–450)
PMV BLD AUTO: 9.5 FL (ref 9.4–12.3)
POTASSIUM SERPL-SCNC: 3.7 MMOL/L (ref 3.5–5.1)
PROT SERPL-MCNC: 6.6 G/DL (ref 6.3–8.2)
PROT UR-MCNC: 48 MG/DL
PROT/CREAT UR-RTO: 0.9
RBC # BLD AUTO: 4.58 M/UL (ref 4.05–5.2)
SODIUM SERPL-SCNC: 141 MMOL/L (ref 136–145)
URATE SERPL-MCNC: 5 MG/DL (ref 2.5–7.1)
WBC # BLD AUTO: 9.6 K/UL (ref 4.3–11.1)

## 2025-07-21 PROCEDURE — 83615 LACTATE (LD) (LDH) ENZYME: CPT

## 2025-07-21 PROCEDURE — 6370000000 HC RX 637 (ALT 250 FOR IP): Performed by: OBSTETRICS & GYNECOLOGY

## 2025-07-21 PROCEDURE — G0378 HOSPITAL OBSERVATION PER HR: HCPCS

## 2025-07-21 PROCEDURE — 85027 COMPLETE CBC AUTOMATED: CPT

## 2025-07-21 PROCEDURE — 2580000003 HC RX 258: Performed by: OBSTETRICS & GYNECOLOGY

## 2025-07-21 PROCEDURE — 84156 ASSAY OF PROTEIN URINE: CPT

## 2025-07-21 PROCEDURE — 6360000002 HC RX W HCPCS: Performed by: OBSTETRICS & GYNECOLOGY

## 2025-07-21 PROCEDURE — 99283 EMERGENCY DEPT VISIT LOW MDM: CPT

## 2025-07-21 PROCEDURE — 80053 COMPREHEN METABOLIC PANEL: CPT

## 2025-07-21 PROCEDURE — 82570 ASSAY OF URINE CREATININE: CPT

## 2025-07-21 PROCEDURE — 84550 ASSAY OF BLOOD/URIC ACID: CPT

## 2025-07-21 RX ORDER — ACETAMINOPHEN 500 MG
1000 TABLET ORAL ONCE
Status: COMPLETED | OUTPATIENT
Start: 2025-07-21 | End: 2025-07-21

## 2025-07-21 RX ORDER — ONDANSETRON 4 MG/1
4 TABLET, ORALLY DISINTEGRATING ORAL EVERY 6 HOURS PRN
Status: DISCONTINUED | OUTPATIENT
Start: 2025-07-21 | End: 2025-07-23 | Stop reason: HOSPADM

## 2025-07-21 RX ORDER — CALCIUM GLUCONATE 98 MG/ML
1000 INJECTION, SOLUTION INTRAVENOUS PRN
Status: DISCONTINUED | OUTPATIENT
Start: 2025-07-21 | End: 2025-07-23 | Stop reason: HOSPADM

## 2025-07-21 RX ORDER — FAMOTIDINE 20 MG/1
20 TABLET, FILM COATED ORAL 2 TIMES DAILY
Status: DISCONTINUED | OUTPATIENT
Start: 2025-07-21 | End: 2025-07-23 | Stop reason: HOSPADM

## 2025-07-21 RX ORDER — MAGNESIUM SULFATE HEPTAHYDRATE 40 MG/ML
4000 INJECTION, SOLUTION INTRAVENOUS ONCE
Status: COMPLETED | OUTPATIENT
Start: 2025-07-21 | End: 2025-07-21

## 2025-07-21 RX ORDER — ONDANSETRON 2 MG/ML
4 INJECTION INTRAMUSCULAR; INTRAVENOUS EVERY 6 HOURS PRN
Status: DISCONTINUED | OUTPATIENT
Start: 2025-07-21 | End: 2025-07-23 | Stop reason: HOSPADM

## 2025-07-21 RX ORDER — SODIUM CHLORIDE 0.9 % (FLUSH) 0.9 %
5-40 SYRINGE (ML) INJECTION PRN
Status: DISCONTINUED | OUTPATIENT
Start: 2025-07-21 | End: 2025-07-23 | Stop reason: HOSPADM

## 2025-07-21 RX ORDER — SIMETHICONE 80 MG
80 TABLET,CHEWABLE ORAL EVERY 6 HOURS PRN
Status: DISCONTINUED | OUTPATIENT
Start: 2025-07-21 | End: 2025-07-23 | Stop reason: HOSPADM

## 2025-07-21 RX ORDER — LABETALOL HYDROCHLORIDE 5 MG/ML
20 INJECTION, SOLUTION INTRAVENOUS
Status: COMPLETED | OUTPATIENT
Start: 2025-07-21 | End: 2025-07-21

## 2025-07-21 RX ORDER — SODIUM CHLORIDE, SODIUM LACTATE, POTASSIUM CHLORIDE, CALCIUM CHLORIDE 600; 310; 30; 20 MG/100ML; MG/100ML; MG/100ML; MG/100ML
INJECTION, SOLUTION INTRAVENOUS CONTINUOUS
Status: DISCONTINUED | OUTPATIENT
Start: 2025-07-21 | End: 2025-07-23 | Stop reason: HOSPADM

## 2025-07-21 RX ORDER — SODIUM CHLORIDE 9 MG/ML
INJECTION, SOLUTION INTRAVENOUS PRN
Status: DISCONTINUED | OUTPATIENT
Start: 2025-07-21 | End: 2025-07-23 | Stop reason: HOSPADM

## 2025-07-21 RX ORDER — LABETALOL HYDROCHLORIDE 5 MG/ML
40 INJECTION, SOLUTION INTRAVENOUS
Status: COMPLETED | OUTPATIENT
Start: 2025-07-21 | End: 2025-07-21

## 2025-07-21 RX ORDER — MAGNESIUM SULFATE HEPTAHYDRATE 40 MG/ML
2000 INJECTION, SOLUTION INTRAVENOUS ONCE
Status: COMPLETED | OUTPATIENT
Start: 2025-07-21 | End: 2025-07-21

## 2025-07-21 RX ORDER — ACETAMINOPHEN 500 MG
1000 TABLET ORAL EVERY 6 HOURS PRN
Status: DISCONTINUED | OUTPATIENT
Start: 2025-07-21 | End: 2025-07-23 | Stop reason: HOSPADM

## 2025-07-21 RX ORDER — LABETALOL HYDROCHLORIDE 5 MG/ML
80 INJECTION, SOLUTION INTRAVENOUS
Status: DISCONTINUED | OUTPATIENT
Start: 2025-07-21 | End: 2025-07-23 | Stop reason: HOSPADM

## 2025-07-21 RX ORDER — HYDRALAZINE HYDROCHLORIDE 20 MG/ML
10 INJECTION INTRAMUSCULAR; INTRAVENOUS
Status: DISCONTINUED | OUTPATIENT
Start: 2025-07-21 | End: 2025-07-23 | Stop reason: HOSPADM

## 2025-07-21 RX ORDER — NIFEDIPINE 10 MG/1
10 CAPSULE ORAL ONCE
Status: COMPLETED | OUTPATIENT
Start: 2025-07-21 | End: 2025-07-21

## 2025-07-21 RX ORDER — SODIUM CHLORIDE 0.9 % (FLUSH) 0.9 %
5-40 SYRINGE (ML) INJECTION EVERY 12 HOURS SCHEDULED
Status: DISCONTINUED | OUTPATIENT
Start: 2025-07-21 | End: 2025-07-23 | Stop reason: HOSPADM

## 2025-07-21 RX ADMIN — MAGNESIUM SULFATE HEPTAHYDRATE 2000 MG/HR: 40 INJECTION, SOLUTION INTRAVENOUS at 15:03

## 2025-07-21 RX ADMIN — MAGNESIUM SULFATE HEPTAHYDRATE 2000 MG: 40 INJECTION, SOLUTION INTRAVENOUS at 14:52

## 2025-07-21 RX ADMIN — LABETALOL HYDROCHLORIDE 40 MG: 5 INJECTION INTRAVENOUS at 14:44

## 2025-07-21 RX ADMIN — FAMOTIDINE 20 MG: 20 TABLET, FILM COATED ORAL at 21:33

## 2025-07-21 RX ADMIN — NIFEDIPINE 10 MG: 10 CAPSULE ORAL at 14:19

## 2025-07-21 RX ADMIN — LABETALOL HYDROCHLORIDE 20 MG: 5 INJECTION INTRAVENOUS at 14:34

## 2025-07-21 RX ADMIN — MAGNESIUM SULFATE HEPTAHYDRATE 4000 MG: 40 INJECTION, SOLUTION INTRAVENOUS at 14:40

## 2025-07-21 RX ADMIN — ACETAMINOPHEN 1000 MG: 500 TABLET, FILM COATED ORAL at 21:33

## 2025-07-21 RX ADMIN — SODIUM CHLORIDE, SODIUM LACTATE, POTASSIUM CHLORIDE, AND CALCIUM CHLORIDE 75 ML/HR: .6; .31; .03; .02 INJECTION, SOLUTION INTRAVENOUS at 14:42

## 2025-07-21 RX ADMIN — ACETAMINOPHEN 1000 MG: 500 TABLET, FILM COATED ORAL at 15:28

## 2025-07-21 ASSESSMENT — PAIN DESCRIPTION - LOCATION: LOCATION: HEAD

## 2025-07-21 ASSESSMENT — PAIN SCALES - GENERAL: PAINLEVEL_OUTOF10: 5

## 2025-07-21 NOTE — H&P
History & Physical    Name: Mal Rowan MRN: [id number]  SSN: [id number]    YOB: 2005  Age: 20 y.o.  Sex: female      Subjective:     Reason for Triage visit:  postpartum headache    History of Present Illness: Ms. Garry Rowan is a 20 y.o.  female PPD #11 who presents with complaint of headache since postpartum day 3-4.  She thought she might be having sinus issues or migraines. The headache is frontal bilateral and rates pain currently as 5/10.  Denies visual changes. No RUQ pain or epigastric pain.  States she has some swelling in her feet but it is mild.  Denies any issues with elevated blood pressure in recent pregnancy. She had an uncomplicated vaginal delivery. She has been alternating Tylenol/ ibuprofen at home with no relief in headache pain.     Recent pregnancy was complicated by anemia, asthma, poor fetal growth, hx HSV.     Patient denies chest pain, fever, shortness of breath, and visual disturbances.    OB History    Para Term  AB Living   1 1 1   1   SAB IAB Ectopic Molar Multiple Live Births       0 1      # Outcome Date GA Lbr Jayson/2nd Weight Sex Type Anes PTL Lv   1 Term 07/10/25 37w4d 05:10 / 00:14 2.64 kg F Vag-Spont EPI N MAGDALENA     Past Medical History:   Diagnosis Date    Eczema     Migraine without aura 2021    Mild intermittent asthma 2021     No past surgical history on file.  Social History     Occupational History    Not on file   Tobacco Use    Smoking status: Never     Passive exposure: Yes    Smokeless tobacco: Never   Vaping Use    Vaping status: Never Used   Substance and Sexual Activity    Alcohol use: Not Currently    Drug use: Not Currently    Sexual activity: Yes     Partners: Male     Birth control/protection: Condom      Family History   Problem Relation Age of Onset    Breast Cancer Maternal Grandmother     Breast Cancer Paternal Great Grandmother     Uterine Cancer Maternal Great Grandmother     Colon Cancer

## 2025-07-21 NOTE — PROGRESS NOTES
Patient arrived to OB triage postpartum 11 day with a c/o headache that's been persist for about 5-6 days after discharged from hospital. Patient also states she's had some chest pain that has since went away. Denies blurred vision or RUQ pain. Pt placed in 65 for further assessment.

## 2025-07-22 PROCEDURE — 2580000003 HC RX 258: Performed by: OBSTETRICS & GYNECOLOGY

## 2025-07-22 PROCEDURE — 6360000002 HC RX W HCPCS: Performed by: OBSTETRICS & GYNECOLOGY

## 2025-07-22 PROCEDURE — G0378 HOSPITAL OBSERVATION PER HR: HCPCS

## 2025-07-22 PROCEDURE — 6370000000 HC RX 637 (ALT 250 FOR IP): Performed by: OBSTETRICS & GYNECOLOGY

## 2025-07-22 RX ORDER — FUROSEMIDE 20 MG/1
20 TABLET ORAL DAILY
Status: DISCONTINUED | OUTPATIENT
Start: 2025-07-22 | End: 2025-07-23 | Stop reason: HOSPADM

## 2025-07-22 RX ADMIN — FUROSEMIDE 20 MG: 20 TABLET ORAL at 10:31

## 2025-07-22 RX ADMIN — MAGNESIUM SULFATE HEPTAHYDRATE 2000 MG/HR: 40 INJECTION, SOLUTION INTRAVENOUS at 11:26

## 2025-07-22 RX ADMIN — MAGNESIUM SULFATE HEPTAHYDRATE 2000 MG/HR: 40 INJECTION, SOLUTION INTRAVENOUS at 01:13

## 2025-07-22 RX ADMIN — FAMOTIDINE 20 MG: 20 TABLET, FILM COATED ORAL at 08:36

## 2025-07-22 RX ADMIN — FAMOTIDINE 20 MG: 20 TABLET, FILM COATED ORAL at 21:08

## 2025-07-22 RX ADMIN — SODIUM CHLORIDE, SODIUM LACTATE, POTASSIUM CHLORIDE, AND CALCIUM CHLORIDE: .6; .31; .03; .02 INJECTION, SOLUTION INTRAVENOUS at 03:19

## 2025-07-22 NOTE — PLAN OF CARE
Problem: Seizure Precautions  Goal: Remains free of injury related to seizures activity  Outcome: Progressing     Problem: Pain  Goal: Verbalizes/displays adequate comfort level or baseline comfort level  Outcome: Progressing     Problem: Risk for Decreased Cardiac Output  Goal: Maintains optimal cardiac output and hemodynamic stability  Description: INTERVENTIONS:.  -Monitor blood pressure and heart rate  -Monitor urine output and notify licensed practitioner for values outside of   -Assess for signes of decreased cardiac output  -Administer fluid and /or volume expanders as ordered    Outcome: Progressing     Problem: Risk for Decreased Cardiac Output  Goal: Achieves optimal ventilation and oxygenation  Description: INTERVENTIONS:.  -Assess for changes in respiratory status   -Assess for changes in mentation and behavior  -Position to facilitate oxygenation and minimize respiratory effort  -Oxygen supplementation based on oxygen saturation or arterial blood gases  -Initiate smoking cessation protocol as indicated  -Encourage broncho-pulmonary hygiene including cough, deep breathe, incentive spirometry  -Assess the need for suctioning and aspirate as needed  -Assess and instruct to report shortness of breath or any respiratory difficulty  -Respiratory therapy support as indicated      Outcome: Progressing

## 2025-07-22 NOTE — PROGRESS NOTES
With rounding this RN noticed the family member sleeping on the couch in the room with the baby. I woke her and asked her to place the baby in the bassinet please so that the baby does not fall.

## 2025-07-22 NOTE — PROGRESS NOTES
Progress Note                               Patient: Mal Rowan MRN: [id number]  SSN: [id number]    YOB: 2005  Age: 20 y.o.  Sex: female      PPD 12 days , admitted for PP PreE    Subjective:     Denies any preeclampsia symptoms.   Family and her bay are present at bedside     Objective:     Patient Vitals for the past 18 hrs:   Temp Pulse Resp BP SpO2   25 0914 -- 93 -- 117/70 99 %   25 0814 98.4 °F (36.9 °C) 90 22 115/67 98 %   25 0714 -- -- -- 130/75 --   25 0643 -- -- -- -- 98 %   25 0638 -- -- -- -- 97 %   25 0633 -- -- -- -- 97 %   25 0628 -- -- -- -- 97 %   25 0623 -- -- -- -- 97 %   25 0618 -- -- -- -- 97 %   25 0614 -- 83 -- 128/80 --   25 0613 -- 87 -- -- 97 %   25 0608 -- 89 -- -- 97 %   25 0603 -- 85 -- -- 97 %   25 0558 -- 84 -- -- 98 %   25 0553 -- 88 -- -- 97 %   25 0548 -- (!) 102 -- -- 98 %   25 0514 -- 90 -- (!) 141/84 --   25 0414 97.9 °F (36.6 °C) -- 17 -- 99 %   25 0230 -- -- 16 -- --   25 0114 -- -- 16 -- --   25 0014 -- -- 16 -- 99 %   25 -- -- 17 -- 99 %   25 -- -- 17 -- 99 %   25 98.2 °F (36.8 °C) -- 18 -- 99 %   25 98.2 °F (36.8 °C) 93 18 (!) 119/59 99 %   25 -- -- -- -- 99 %   25 -- -- -- -- 99 %   25 -- -- -- -- 99 %   25 -- 100 -- (!) 115/57 --   25 -- (!) 102 -- -- 99 %   25 -- (!) 102 -- 120/62 --   25 -- -- -- -- 99 %   25 -- -- -- -- 100 %   25 -- -- -- -- 100 %   25 -- (!) 110 -- 116/71 --   25 -- (!) 108 -- -- 100 %   25 1647 -- (!) 116 -- 120/77 --   25 1613 -- -- -- -- 99 %        Temp (24hrs), Av.2 °F (36.8 °C), Min:97.9 °F (36.6 °C), Max:98.4 °F (36.9 °C)      Physical exam:  HEENT: NCAT   Cardiovascular: RRR  Respiratory: Normal

## 2025-07-23 VITALS
OXYGEN SATURATION: 98 % | RESPIRATION RATE: 16 BRPM | DIASTOLIC BLOOD PRESSURE: 72 MMHG | HEART RATE: 63 BPM | TEMPERATURE: 97.8 F | SYSTOLIC BLOOD PRESSURE: 124 MMHG

## 2025-07-23 PROCEDURE — 6370000000 HC RX 637 (ALT 250 FOR IP): Performed by: OBSTETRICS & GYNECOLOGY

## 2025-07-23 PROCEDURE — G0378 HOSPITAL OBSERVATION PER HR: HCPCS

## 2025-07-23 PROCEDURE — 99238 HOSP IP/OBS DSCHRG MGMT 30/<: CPT | Performed by: OBSTETRICS & GYNECOLOGY

## 2025-07-23 RX ORDER — FUROSEMIDE 20 MG/1
20 TABLET ORAL DAILY
Qty: 3 TABLET | Refills: 0 | Status: SHIPPED | OUTPATIENT
Start: 2025-07-23 | End: 2025-07-26

## 2025-07-23 RX ORDER — NIFEDIPINE 30 MG/1
30 TABLET, EXTENDED RELEASE ORAL DAILY
Status: DISCONTINUED | OUTPATIENT
Start: 2025-07-23 | End: 2025-07-23 | Stop reason: HOSPADM

## 2025-07-23 RX ORDER — NIFEDIPINE 10 MG/1
10 CAPSULE ORAL EVERY 8 HOURS SCHEDULED
Status: DISCONTINUED | OUTPATIENT
Start: 2025-07-23 | End: 2025-07-23

## 2025-07-23 RX ORDER — NIFEDIPINE 30 MG/1
30 TABLET, EXTENDED RELEASE ORAL DAILY
Qty: 30 TABLET | Refills: 3 | Status: SHIPPED | OUTPATIENT
Start: 2025-07-23

## 2025-07-23 RX ORDER — NIFEDIPINE 10 MG/1
10 CAPSULE ORAL
Status: COMPLETED | OUTPATIENT
Start: 2025-07-23 | End: 2025-07-23

## 2025-07-23 RX ADMIN — NIFEDIPINE 10 MG: 10 CAPSULE ORAL at 11:53

## 2025-07-23 RX ADMIN — NIFEDIPINE 30 MG: 30 TABLET, FILM COATED, EXTENDED RELEASE ORAL at 11:23

## 2025-07-23 RX ADMIN — FUROSEMIDE 20 MG: 20 TABLET ORAL at 07:45

## 2025-07-23 RX ADMIN — FAMOTIDINE 20 MG: 20 TABLET, FILM COATED ORAL at 07:45

## 2025-07-23 RX ADMIN — ACETAMINOPHEN 1000 MG: 500 TABLET, FILM COATED ORAL at 10:54

## 2025-07-23 ASSESSMENT — PAIN SCALES - GENERAL: PAINLEVEL_OUTOF10: 2

## 2025-07-23 NOTE — PROGRESS NOTES
High Risk Obstetrics Progress Note    Name: Mal Rowan MRN: [id number]  SSN: [id number]    YOB: 2005  Age: 20 y.o.  Sex: female      Subjective:      LOS: 0 days    Estimated Date of Delivery: 25   Gestational Age Today: 37w4d     Patient admitted for Headache and HTN.  Has completed 24h of magnesium.  Feels better.. States she does not have headache , right upper quadrant pain  , shortness of breath, swelling, and visual disturbances.    Objective:     Vitals:  Blood pressure (!) 151/87, pulse 63, temperature 97.8 °F (36.6 °C), temperature source Oral, resp. rate 16, last menstrual period 10/20/2024, SpO2 98%, unknown if currently breastfeeding.   Temp (24hrs), Av °F (36.7 °C), Min:97.8 °F (36.6 °C), Max:98.1 °F (36.7 °C)    Systolic (24hrs), Av , Min:112 , Max:151      Diastolic (24hrs), Av, Min:60, Max:93       Intake and Output:         Physical Exam:   Patient without distress. Neuro / Psych grossly WNL A&O X 3 Lower extremities:   - Edema No   - No evidence of DVT seen on physical exam.    Labs: No results found for this or any previous visit (from the past 36 hours).   OK on admission    Assessment and Plan:      Principal Problem:    Preeclampsia in postpartum period  Resolved Problems:    * No resolved hospital problems. *     A few mildly elevated BP's today.  Will start procardia 30Xl.  Hope to d/c today as long as responds to meds.  Would need f/u early next week.  Precautions given, voices understanding.

## 2025-07-23 NOTE — PLAN OF CARE
Problem: Seizure Precautions  Goal: Remains free of injury related to seizures activity  Outcome: Progressing     Problem: Pain  Goal: Verbalizes/displays adequate comfort level or baseline comfort level  Outcome: Progressing     Problem: Risk for Decreased Cardiac Output  Goal: Maintains optimal cardiac output and hemodynamic stability  Description: INTERVENTIONS:.  -Monitor blood pressure and heart rate  -Monitor urine output and notify licensed practitioner for values outside of   -Assess for signes of decreased cardiac output  -Administer fluid and /or volume expanders as ordered    Outcome: Progressing  Goal: Achieves optimal ventilation and oxygenation  Description: INTERVENTIONS:.  -Assess for changes in respiratory status   -Assess for changes in mentation and behavior  -Position to facilitate oxygenation and minimize respiratory effort  -Oxygen supplementation based on oxygen saturation or arterial blood gases  -Initiate smoking cessation protocol as indicated  -Encourage broncho-pulmonary hygiene including cough, deep breathe, incentive spirometry  -Assess the need for suctioning and aspirate as needed  -Assess and instruct to report shortness of breath or any respiratory difficulty  -Respiratory therapy support as indicated      Outcome: Progressing     Problem: Risk for Deficient Fluid Volume  Goal: Hemodynamic stability and optimal renal function maintained  Description: Interventions:.  -Monitor labs and assess for signs and symptoms of volume excess or deficit    -Monitor intake, output and patient weight  -Monitor response to interventions for patient's volume status, including labs, urine output, blood pressure (other measures as available)  -Fluid restriction as ordered  -Instruct patient on fluid and nutrition restrictions as appropriate      Outcome: Progressing

## 2025-07-24 ENCOUNTER — TELEPHONE (OUTPATIENT)
Dept: OBGYN CLINIC | Age: 20
End: 2025-07-24

## 2025-07-24 NOTE — DISCHARGE SUMMARY
Obstetrical Discharge Summary     Name: Mal Rowan MRN: [id number]  SSN: [id number]    YOB: 2005  Age: 20 y.o.  Sex: female      Allergies: Azithromycin, Cefdinir, Pineapple extract, Amoxicillin-pot clavulanate, and Other    Admit Date: 7/21/2025    Discharge Date: 7/24/2025     Admitting Physician: Makeda Pimentel MD     Attending Physician:  Karthik Brand MD     * Admission Diagnoses: Preeclampsia in postpartum period [O14.95]    * Discharge Diagnoses: Preeclampsia in postpartum period [O14.95]              Additional Diagnoses:   Hospital Problems           Last Modified POA    * (Principal) Preeclampsia in postpartum period 7/21/2025 Yes        All lab results for the last 24 hours reviewed.     Immunizations:    Immunization History   Administered Date(s) Administered    COVID-19, PFIZER GRAY top, DO NOT Dilute, (age 12 y+), IM, 30 mcg/0.3 mL 02/03/2022    TDaP, ADACEL (age 10y-64y), BOOSTRIX (age 10y+), IM, 0.5mL 05/07/2025       Group Beta Strep: No components found for: \"OBEXTGRBS\"        Lab Results   Component Value Date/Time    ABORH O POSITIVE 07/10/2025 06:59 AM      Immunization History   Administered Date(s) Administered    COVID-19, PFIZER GRAY top, DO NOT Dilute, (age 12 y+), IM, 30 mcg/0.3 mL 02/03/2022    TDaP, ADACEL (age 10y-64y), BOOSTRIX (age 10y+), IM, 0.5mL 05/07/2025       * Procedures: *          * Discharge Condition: Good    * Hospital Course: Normal hospital course following the delivery.    * Disposition: Home    Discharge Medications:      Medication List        START taking these medications      furosemide 20 MG tablet  Commonly known as: Lasix  Take 1 tablet by mouth daily for 3 days     NIFEdipine 30 MG extended release tablet  Commonly known as: PROCARDIA XL  Take 1 tablet by mouth daily            CONTINUE taking these medications      acetaminophen 325 MG tablet  Commonly known as: TYLENOL     albuterol sulfate  (90 Base) MCG/ACT

## 2025-07-24 NOTE — TELEPHONE ENCOUNTER
----- Message from Dr. Karthik Brand MD sent at 7/24/2025  8:43 AM EDT -----  Please have her come in for BP on Monday  ----- Message -----  From: Darek Parker MD  Sent: 7/24/2025   8:36 AM EDT  To: Karthik Brand MD    Was admitted this week with PP Pre-E.  Discharged home yesterday on Procardia.  Needs f/u with you early next week.

## 2025-07-29 ENCOUNTER — CLINICAL SUPPORT (OUTPATIENT)
Dept: OBGYN CLINIC | Age: 20
End: 2025-07-29
Payer: COMMERCIAL

## 2025-07-29 VITALS
HEIGHT: 66 IN | WEIGHT: 125 LBS | DIASTOLIC BLOOD PRESSURE: 76 MMHG | SYSTOLIC BLOOD PRESSURE: 108 MMHG | BODY MASS INDEX: 20.09 KG/M2

## 2025-07-29 PROCEDURE — 99211 OFF/OP EST MAY X REQ PHY/QHP: CPT | Performed by: OBSTETRICS & GYNECOLOGY

## 2025-07-29 NOTE — PROGRESS NOTES
Patient comes in today for BP check. Denies HA, blurry vision, SOB, chest pain. Patient last took Procardia 30mg at 9am yesterday.     Delivered: 07/10/2025 vaginal     Finished lasix on Sunday.     Vitals:    07/29/25 1323   BP: 108/76   BP Site: Left Upper Arm   Patient Position: Sitting   Weight: 56.7 kg (125 lb)   Height: 1.676 m (5' 6\")     Per Dr. Brand, patient can stop meds and RTO for 6wk PPV.

## 2025-08-25 PROBLEM — J45.909 ASTHMA DURING PREGNANCY: Status: RESOLVED | Noted: 2025-03-14 | Resolved: 2025-08-25

## 2025-08-25 PROBLEM — O09.93 HIGH-RISK PREGNANCY IN THIRD TRIMESTER: Status: RESOLVED | Noted: 2025-05-27 | Resolved: 2025-08-25

## 2025-08-25 PROBLEM — O99.013 ANEMIA AFFECTING PREGNANCY IN THIRD TRIMESTER: Status: RESOLVED | Noted: 2025-05-08 | Resolved: 2025-08-25

## 2025-08-25 PROBLEM — O99.519 ASTHMA DURING PREGNANCY: Status: RESOLVED | Noted: 2025-03-14 | Resolved: 2025-08-25

## 2025-08-25 PROBLEM — O36.5930 POOR FETAL GROWTH AFFECTING MANAGEMENT OF MOTHER IN THIRD TRIMESTER: Status: RESOLVED | Noted: 2025-05-07 | Resolved: 2025-08-25

## 2025-08-25 PROBLEM — Z34.03 PRIMIGRAVIDA IN THIRD TRIMESTER: Status: RESOLVED | Noted: 2025-01-10 | Resolved: 2025-08-25

## 2025-08-26 ENCOUNTER — POSTPARTUM VISIT (OUTPATIENT)
Dept: OBGYN CLINIC | Age: 20
End: 2025-08-26
Payer: COMMERCIAL

## 2025-08-26 VITALS
WEIGHT: 127 LBS | SYSTOLIC BLOOD PRESSURE: 104 MMHG | BODY MASS INDEX: 20.41 KG/M2 | DIASTOLIC BLOOD PRESSURE: 62 MMHG | HEIGHT: 66 IN

## 2025-09-04 LAB
C TRACH RRNA CVX QL NAA+PROBE: NEGATIVE
CYTOLOGIST CVX/VAG CYTO: ABNORMAL
CYTOLOGY CVX/VAG DOC THIN PREP: ABNORMAL
HPV REFLEX: ABNORMAL
Lab: ABNORMAL
N GONORRHOEA RRNA CVX QL NAA+PROBE: NEGATIVE
PATH REPORT.FINAL DX SPEC: ABNORMAL
PATHOLOGIST CVX/VAG CYTO: ABNORMAL
PATHOLOGIST PROVIDED ICD: ABNORMAL
RECOM F/U CVX/VAG CYTO: ABNORMAL
STAT OF ADQ CVX/VAG CYTO-IMP: ABNORMAL
T VAGINALIS RRNA SPEC QL NAA+PROBE: NEGATIVE